# Patient Record
Sex: MALE | Race: WHITE | NOT HISPANIC OR LATINO | Employment: UNEMPLOYED | URBAN - METROPOLITAN AREA
[De-identification: names, ages, dates, MRNs, and addresses within clinical notes are randomized per-mention and may not be internally consistent; named-entity substitution may affect disease eponyms.]

---

## 2019-06-25 ENCOUNTER — HOSPITAL ENCOUNTER (INPATIENT)
Facility: HOSPITAL | Age: 34
LOS: 5 days | Discharge: HOME HEALTH | End: 2019-06-30
Attending: EMERGENCY MEDICINE | Admitting: INTERNAL MEDICINE

## 2019-06-25 DIAGNOSIS — L08.9 INFECTED WOUND: ICD-10-CM

## 2019-06-25 DIAGNOSIS — M79.605 LEFT LEG PAIN: ICD-10-CM

## 2019-06-25 DIAGNOSIS — L03.116 LEFT LEG CELLULITIS: Primary | ICD-10-CM

## 2019-06-25 DIAGNOSIS — D72.829 LEUKOCYTOSIS, UNSPECIFIED TYPE: ICD-10-CM

## 2019-06-25 DIAGNOSIS — T14.8XXA INFECTED WOUND: ICD-10-CM

## 2019-06-25 DIAGNOSIS — E87.6 HYPOKALEMIA: ICD-10-CM

## 2019-06-25 PROBLEM — E66.01 MORBID OBESITY WITH BMI OF 60.0-69.9, ADULT (HCC): Chronic | Status: ACTIVE | Noted: 2019-06-25

## 2019-06-25 LAB
ANION GAP SERPL CALCULATED.3IONS-SCNC: 13 MMOL/L (ref 10–20)
BASOPHILS # BLD AUTO: 0 10*3/MM3 (ref 0–0.2)
BASOPHILS NFR BLD AUTO: 0.2 % (ref 0–1.5)
BUN BLD-MCNC: 10 MG/DL (ref 8–20)
BUN/CREAT SERPL: 11.1 (ref 6.2–20.3)
CALCIUM SPEC-SCNC: 8.7 MG/DL (ref 8.9–10.3)
CHLORIDE SERPL-SCNC: 98 MMOL/L (ref 101–111)
CO2 SERPL-SCNC: 23 MMOL/L (ref 22–32)
CREAT BLD-MCNC: 0.9 MG/DL (ref 0.7–1.2)
D-LACTATE SERPL-SCNC: 1.6 MMOL/L (ref 0.5–2)
DEPRECATED RDW RBC AUTO: 48.1 FL (ref 37–54)
EOSINOPHIL # BLD AUTO: 0 10*3/MM3 (ref 0–0.4)
EOSINOPHIL NFR BLD AUTO: 0.1 % (ref 0.3–6.2)
ERYTHROCYTE [DISTWIDTH] IN BLOOD BY AUTOMATED COUNT: 15.3 % (ref 12.3–15.4)
GFR SERPL CREATININE-BSD FRML MDRD: 98 ML/MIN/1.73
GLUCOSE BLD-MCNC: 126 MG/DL (ref 65–99)
HCT VFR BLD AUTO: 40.5 % (ref 37.5–51)
HGB BLD-MCNC: 13.1 G/DL (ref 13–17.7)
LYMPHOCYTES # BLD AUTO: 1.2 10*3/MM3 (ref 0.7–3.1)
LYMPHOCYTES NFR BLD AUTO: 6.5 % (ref 19.6–45.3)
MCH RBC QN AUTO: 28.7 PG (ref 26.6–33)
MCHC RBC AUTO-ENTMCNC: 32.4 G/DL (ref 31.5–35.7)
MCV RBC AUTO: 88.6 FL (ref 79–97)
MONOCYTES # BLD AUTO: 1.7 10*3/MM3 (ref 0.1–0.9)
MONOCYTES NFR BLD AUTO: 8.9 % (ref 5–12)
NEUTROPHILS # BLD AUTO: 15.7 10*3/MM3 (ref 1.7–7)
NEUTROPHILS NFR BLD AUTO: 84.3 % (ref 42.7–76)
NEUTS VAC BLD QL SMEAR: NORMAL
NRBC BLD AUTO-RTO: 0 /100 WBC (ref 0–0.2)
PLAT MORPH BLD: NORMAL
PLATELET # BLD AUTO: 249 10*3/MM3 (ref 140–450)
PMV BLD AUTO: 10.9 FL (ref 6–12)
POTASSIUM BLD-SCNC: 3 MMOL/L (ref 3.6–5.1)
RBC # BLD AUTO: 4.57 10*6/MM3 (ref 4.14–5.8)
RBC MORPH BLD: NORMAL
SODIUM BLD-SCNC: 134 MMOL/L (ref 136–144)
TOXIC GRANULATION: NORMAL
WBC NRBC COR # BLD: 18.7 10*3/MM3 (ref 3.4–10.8)

## 2019-06-25 PROCEDURE — 83605 ASSAY OF LACTIC ACID: CPT

## 2019-06-25 PROCEDURE — 99223 1ST HOSP IP/OBS HIGH 75: CPT | Performed by: INTERNAL MEDICINE

## 2019-06-25 PROCEDURE — 99284 EMERGENCY DEPT VISIT MOD MDM: CPT

## 2019-06-25 PROCEDURE — 36415 COLL VENOUS BLD VENIPUNCTURE: CPT

## 2019-06-25 PROCEDURE — 85007 BL SMEAR W/DIFF WBC COUNT: CPT | Performed by: EMERGENCY MEDICINE

## 2019-06-25 PROCEDURE — 25010000002 VANCOMYCIN 750 MG RECONSTITUTED SOLUTION 1 EACH VIAL: Performed by: EMERGENCY MEDICINE

## 2019-06-25 PROCEDURE — 80048 BASIC METABOLIC PNL TOTAL CA: CPT | Performed by: EMERGENCY MEDICINE

## 2019-06-25 PROCEDURE — 87040 BLOOD CULTURE FOR BACTERIA: CPT | Performed by: EMERGENCY MEDICINE

## 2019-06-25 PROCEDURE — 85025 COMPLETE CBC W/AUTO DIFF WBC: CPT | Performed by: EMERGENCY MEDICINE

## 2019-06-25 RX ORDER — POTASSIUM CHLORIDE 20 MEQ/1
40 TABLET, EXTENDED RELEASE ORAL ONCE
Status: COMPLETED | OUTPATIENT
Start: 2019-06-25 | End: 2019-06-25

## 2019-06-25 RX ORDER — SODIUM CHLORIDE, SODIUM LACTATE, POTASSIUM CHLORIDE, CALCIUM CHLORIDE 600; 310; 30; 20 MG/100ML; MG/100ML; MG/100ML; MG/100ML
100 INJECTION, SOLUTION INTRAVENOUS CONTINUOUS
Status: DISCONTINUED | OUTPATIENT
Start: 2019-06-26 | End: 2019-06-29

## 2019-06-25 RX ORDER — CHOLECALCIFEROL (VITAMIN D3) 125 MCG
5 CAPSULE ORAL NIGHTLY PRN
Status: DISCONTINUED | OUTPATIENT
Start: 2019-06-25 | End: 2019-06-30 | Stop reason: HOSPADM

## 2019-06-25 RX ORDER — DOCUSATE SODIUM 100 MG/1
100 CAPSULE, LIQUID FILLED ORAL DAILY
Status: DISCONTINUED | OUTPATIENT
Start: 2019-06-26 | End: 2019-06-30 | Stop reason: HOSPADM

## 2019-06-25 RX ORDER — SODIUM CHLORIDE 0.9 % (FLUSH) 0.9 %
3 SYRINGE (ML) INJECTION EVERY 12 HOURS SCHEDULED
Status: DISCONTINUED | OUTPATIENT
Start: 2019-06-26 | End: 2019-06-30 | Stop reason: HOSPADM

## 2019-06-25 RX ORDER — BISACODYL 10 MG
10 SUPPOSITORY, RECTAL RECTAL DAILY PRN
Status: DISCONTINUED | OUTPATIENT
Start: 2019-06-25 | End: 2019-06-30 | Stop reason: HOSPADM

## 2019-06-25 RX ORDER — SODIUM CHLORIDE 0.9 % (FLUSH) 0.9 %
10 SYRINGE (ML) INJECTION AS NEEDED
Status: DISCONTINUED | OUTPATIENT
Start: 2019-06-25 | End: 2019-06-30 | Stop reason: HOSPADM

## 2019-06-25 RX ORDER — CLINDAMYCIN PHOSPHATE 900 MG/50ML
900 INJECTION, SOLUTION INTRAVENOUS EVERY 8 HOURS
Status: DISCONTINUED | OUTPATIENT
Start: 2019-06-26 | End: 2019-06-29

## 2019-06-25 RX ORDER — HYDROCODONE BITARTRATE AND ACETAMINOPHEN 5; 325 MG/1; MG/1
1 TABLET ORAL EVERY 4 HOURS PRN
Status: DISCONTINUED | OUTPATIENT
Start: 2019-06-25 | End: 2019-06-30 | Stop reason: HOSPADM

## 2019-06-25 RX ORDER — ACETAMINOPHEN 325 MG/1
650 TABLET ORAL EVERY 4 HOURS PRN
Status: DISCONTINUED | OUTPATIENT
Start: 2019-06-25 | End: 2019-06-26

## 2019-06-25 RX ORDER — SODIUM CHLORIDE 0.9 % (FLUSH) 0.9 %
3-10 SYRINGE (ML) INJECTION AS NEEDED
Status: DISCONTINUED | OUTPATIENT
Start: 2019-06-25 | End: 2019-06-30 | Stop reason: HOSPADM

## 2019-06-25 RX ORDER — HEPARIN SODIUM 5000 [USP'U]/ML
5000 INJECTION, SOLUTION INTRAVENOUS; SUBCUTANEOUS EVERY 8 HOURS SCHEDULED
Status: DISCONTINUED | OUTPATIENT
Start: 2019-06-26 | End: 2019-06-30 | Stop reason: HOSPADM

## 2019-06-25 RX ADMIN — POTASSIUM CHLORIDE 40 MEQ: 1500 TABLET, EXTENDED RELEASE ORAL at 18:41

## 2019-06-25 RX ADMIN — VANCOMYCIN HYDROCHLORIDE 2250 MG: 750 INJECTION, POWDER, LYOPHILIZED, FOR SOLUTION INTRAVENOUS at 17:41

## 2019-06-26 ENCOUNTER — ANESTHESIA EVENT (OUTPATIENT)
Dept: PERIOP | Facility: HOSPITAL | Age: 34
End: 2019-06-26

## 2019-06-26 ENCOUNTER — ANESTHESIA (OUTPATIENT)
Dept: PERIOP | Facility: HOSPITAL | Age: 34
End: 2019-06-26

## 2019-06-26 PROBLEM — A41.9 SEPSIS AFFECTING SKIN: Status: ACTIVE | Noted: 2019-06-26

## 2019-06-26 LAB
ANION GAP SERPL CALCULATED.3IONS-SCNC: 12 MMOL/L (ref 10–20)
BUN BLD-MCNC: 11 MG/DL (ref 8–20)
BUN/CREAT SERPL: 11 (ref 6.2–20.3)
CALCIUM SPEC-SCNC: 8.3 MG/DL (ref 8.9–10.3)
CHLORIDE SERPL-SCNC: 101 MMOL/L (ref 101–111)
CO2 SERPL-SCNC: 22 MMOL/L (ref 22–32)
CORTIS SERPL-MCNC: 13.22 MCG/DL
CREAT BLD-MCNC: 1 MG/DL (ref 0.7–1.2)
DEPRECATED RDW RBC AUTO: 46.4 FL (ref 37–54)
EOSINOPHIL # BLD MANUAL: 0.16 10*3/MM3 (ref 0–0.4)
EOSINOPHIL NFR BLD MANUAL: 1 % (ref 0.3–6.2)
ERYTHROCYTE [DISTWIDTH] IN BLOOD BY AUTOMATED COUNT: 15.1 % (ref 12.3–15.4)
GFR SERPL CREATININE-BSD FRML MDRD: 86 ML/MIN/1.73
GLUCOSE BLD-MCNC: 97 MG/DL (ref 65–99)
HBA1C MFR BLD: 5.3 % (ref 3.5–5.6)
HCT VFR BLD AUTO: 36.1 % (ref 37.5–51)
HGB BLD-MCNC: 11.9 G/DL (ref 13–17.7)
LYMPHOCYTES # BLD MANUAL: 2.08 10*3/MM3 (ref 0.7–3.1)
LYMPHOCYTES NFR BLD MANUAL: 13 % (ref 19.6–45.3)
LYMPHOCYTES NFR BLD MANUAL: 8 % (ref 5–12)
MCH RBC QN AUTO: 28.9 PG (ref 26.6–33)
MCHC RBC AUTO-ENTMCNC: 32.9 G/DL (ref 31.5–35.7)
MCV RBC AUTO: 87.8 FL (ref 79–97)
MONOCYTES # BLD AUTO: 1.28 10*3/MM3 (ref 0.1–0.9)
NEUTROPHILS # BLD AUTO: 12.48 10*3/MM3 (ref 1.7–7)
NEUTROPHILS NFR BLD MANUAL: 66 % (ref 42.7–76)
NEUTS BAND NFR BLD MANUAL: 12 % (ref 0–5)
PLAT MORPH BLD: NORMAL
PLATELET # BLD AUTO: 233 10*3/MM3 (ref 140–450)
PMV BLD AUTO: 10.6 FL (ref 6–12)
POTASSIUM BLD-SCNC: 3 MMOL/L (ref 3.6–5.1)
POTASSIUM BLD-SCNC: 3.7 MMOL/L (ref 3.6–5.1)
RBC # BLD AUTO: 4.11 10*6/MM3 (ref 4.14–5.8)
RBC MORPH BLD: NORMAL
SCAN SLIDE: NORMAL
SODIUM BLD-SCNC: 135 MMOL/L (ref 136–144)
TOXIC GRANULATION: ABNORMAL
TSH SERPL DL<=0.05 MIU/L-ACNC: 2.01 MIU/ML (ref 0.34–5.6)
WBC NRBC COR # BLD: 16 10*3/MM3 (ref 3.4–10.8)

## 2019-06-26 PROCEDURE — 25010000002 HYDROMORPHONE PER 4 MG: Performed by: ANESTHESIOLOGIST ASSISTANT

## 2019-06-26 PROCEDURE — 82533 TOTAL CORTISOL: CPT | Performed by: INTERNAL MEDICINE

## 2019-06-26 PROCEDURE — 99222 1ST HOSP IP/OBS MODERATE 55: CPT | Performed by: SURGERY

## 2019-06-26 PROCEDURE — 87070 CULTURE OTHR SPECIMN AEROBIC: CPT | Performed by: NURSE PRACTITIONER

## 2019-06-26 PROCEDURE — 84443 ASSAY THYROID STIM HORMONE: CPT | Performed by: INTERNAL MEDICINE

## 2019-06-26 PROCEDURE — 25010000002 TDAP 5-2.5-18.5 LF-MCG/0.5 SUSPENSION: Performed by: INTERNAL MEDICINE

## 2019-06-26 PROCEDURE — 80048 BASIC METABOLIC PNL TOTAL CA: CPT | Performed by: INTERNAL MEDICINE

## 2019-06-26 PROCEDURE — 11045 DBRDMT SUBQ TISS EACH ADDL: CPT | Performed by: SURGERY

## 2019-06-26 PROCEDURE — 25010000002 DEXAMETHASONE PER 1 MG: Performed by: ANESTHESIOLOGIST ASSISTANT

## 2019-06-26 PROCEDURE — 25010000002 ONDANSETRON PER 1 MG: Performed by: ANESTHESIOLOGIST ASSISTANT

## 2019-06-26 PROCEDURE — 87147 CULTURE TYPE IMMUNOLOGIC: CPT | Performed by: NURSE PRACTITIONER

## 2019-06-26 PROCEDURE — 25010000002 HEPARIN (PORCINE) PER 1000 UNITS: Performed by: INTERNAL MEDICINE

## 2019-06-26 PROCEDURE — 0Y9J0ZZ DRAINAGE OF LEFT LOWER LEG, OPEN APPROACH: ICD-10-PCS | Performed by: SURGERY

## 2019-06-26 PROCEDURE — 87075 CULTR BACTERIA EXCEPT BLOOD: CPT | Performed by: SURGERY

## 2019-06-26 PROCEDURE — 83036 HEMOGLOBIN GLYCOSYLATED A1C: CPT | Performed by: INTERNAL MEDICINE

## 2019-06-26 PROCEDURE — 90715 TDAP VACCINE 7 YRS/> IM: CPT | Performed by: INTERNAL MEDICINE

## 2019-06-26 PROCEDURE — 94640 AIRWAY INHALATION TREATMENT: CPT

## 2019-06-26 PROCEDURE — 87186 SC STD MICRODIL/AGAR DIL: CPT | Performed by: NURSE PRACTITIONER

## 2019-06-26 PROCEDURE — 25010000002 LINEZOLID 600 MG/300ML SOLUTION: Performed by: INTERNAL MEDICINE

## 2019-06-26 PROCEDURE — 0JBP0ZZ EXCISION OF LEFT LOWER LEG SUBCUTANEOUS TISSUE AND FASCIA, OPEN APPROACH: ICD-10-PCS | Performed by: SURGERY

## 2019-06-26 PROCEDURE — 87205 SMEAR GRAM STAIN: CPT | Performed by: NURSE PRACTITIONER

## 2019-06-26 PROCEDURE — 84132 ASSAY OF SERUM POTASSIUM: CPT | Performed by: INTERNAL MEDICINE

## 2019-06-26 PROCEDURE — 85007 BL SMEAR W/DIFF WBC COUNT: CPT | Performed by: INTERNAL MEDICINE

## 2019-06-26 PROCEDURE — 90471 IMMUNIZATION ADMIN: CPT | Performed by: INTERNAL MEDICINE

## 2019-06-26 PROCEDURE — 25010000002 MIDAZOLAM PER 1 MG: Performed by: ANESTHESIOLOGIST ASSISTANT

## 2019-06-26 PROCEDURE — 85025 COMPLETE CBC W/AUTO DIFF WBC: CPT | Performed by: INTERNAL MEDICINE

## 2019-06-26 PROCEDURE — 25010000002 CEFEPIME PER 500 MG: Performed by: INTERNAL MEDICINE

## 2019-06-26 PROCEDURE — 11042 DBRDMT SUBQ TIS 1ST 20SQCM/<: CPT | Performed by: SURGERY

## 2019-06-26 PROCEDURE — 25010000002 FENTANYL CITRATE (PF) 100 MCG/2ML SOLUTION: Performed by: ANESTHESIOLOGIST ASSISTANT

## 2019-06-26 PROCEDURE — 94799 UNLISTED PULMONARY SVC/PX: CPT

## 2019-06-26 PROCEDURE — 25010000002 PROPOFOL 10 MG/ML EMULSION: Performed by: ANESTHESIOLOGIST ASSISTANT

## 2019-06-26 PROCEDURE — 99233 SBSQ HOSP IP/OBS HIGH 50: CPT | Performed by: INTERNAL MEDICINE

## 2019-06-26 RX ORDER — CLINDAMYCIN PHOSPHATE 600 MG/50ML
INJECTION INTRAVENOUS AS NEEDED
Status: DISCONTINUED | OUTPATIENT
Start: 2019-06-26 | End: 2019-06-26 | Stop reason: SURG

## 2019-06-26 RX ORDER — SODIUM CHLORIDE, SODIUM LACTATE, POTASSIUM CHLORIDE, CALCIUM CHLORIDE 600; 310; 30; 20 MG/100ML; MG/100ML; MG/100ML; MG/100ML
9 INJECTION, SOLUTION INTRAVENOUS CONTINUOUS PRN
Status: DISCONTINUED | OUTPATIENT
Start: 2019-06-26 | End: 2019-06-26 | Stop reason: HOSPADM

## 2019-06-26 RX ORDER — MIDAZOLAM HYDROCHLORIDE 1 MG/ML
INJECTION INTRAMUSCULAR; INTRAVENOUS AS NEEDED
Status: DISCONTINUED | OUTPATIENT
Start: 2019-06-26 | End: 2019-06-26 | Stop reason: SURG

## 2019-06-26 RX ORDER — SODIUM CHLORIDE 0.9 % (FLUSH) 0.9 %
3-10 SYRINGE (ML) INJECTION AS NEEDED
Status: DISCONTINUED | OUTPATIENT
Start: 2019-06-26 | End: 2019-06-26 | Stop reason: HOSPADM

## 2019-06-26 RX ORDER — LABETALOL HYDROCHLORIDE 5 MG/ML
5 INJECTION, SOLUTION INTRAVENOUS AS NEEDED
Status: DISCONTINUED | OUTPATIENT
Start: 2019-06-26 | End: 2019-06-30 | Stop reason: HOSPADM

## 2019-06-26 RX ORDER — IPRATROPIUM BROMIDE AND ALBUTEROL SULFATE 2.5; .5 MG/3ML; MG/3ML
3 SOLUTION RESPIRATORY (INHALATION) ONCE
Status: COMPLETED | OUTPATIENT
Start: 2019-06-26 | End: 2019-06-26

## 2019-06-26 RX ORDER — SODIUM CHLORIDE 0.9 % (FLUSH) 0.9 %
3 SYRINGE (ML) INJECTION EVERY 12 HOURS SCHEDULED
Status: DISCONTINUED | OUTPATIENT
Start: 2019-06-26 | End: 2019-06-26 | Stop reason: HOSPADM

## 2019-06-26 RX ORDER — LIDOCAINE HYDROCHLORIDE 20 MG/ML
INJECTION, SOLUTION EPIDURAL; INFILTRATION; INTRACAUDAL; PERINEURAL AS NEEDED
Status: DISCONTINUED | OUTPATIENT
Start: 2019-06-26 | End: 2019-06-26 | Stop reason: SURG

## 2019-06-26 RX ORDER — MORPHINE SULFATE 4 MG/ML
4 INJECTION, SOLUTION INTRAMUSCULAR; INTRAVENOUS
Status: DISCONTINUED | OUTPATIENT
Start: 2019-06-26 | End: 2019-06-30 | Stop reason: HOSPADM

## 2019-06-26 RX ORDER — LORAZEPAM 2 MG/ML
1 INJECTION INTRAMUSCULAR ONCE
Status: DISCONTINUED | OUTPATIENT
Start: 2019-06-26 | End: 2019-06-30 | Stop reason: HOSPADM

## 2019-06-26 RX ORDER — POTASSIUM CHLORIDE 1.5 G/1.77G
40 POWDER, FOR SOLUTION ORAL AS NEEDED
Status: DISCONTINUED | OUTPATIENT
Start: 2019-06-26 | End: 2019-06-30 | Stop reason: HOSPADM

## 2019-06-26 RX ORDER — PROPOFOL 10 MG/ML
VIAL (ML) INTRAVENOUS AS NEEDED
Status: DISCONTINUED | OUTPATIENT
Start: 2019-06-26 | End: 2019-06-26 | Stop reason: SURG

## 2019-06-26 RX ORDER — ONDANSETRON 2 MG/ML
INJECTION INTRAMUSCULAR; INTRAVENOUS AS NEEDED
Status: DISCONTINUED | OUTPATIENT
Start: 2019-06-26 | End: 2019-06-26 | Stop reason: SURG

## 2019-06-26 RX ORDER — ONDANSETRON 2 MG/ML
4 INJECTION INTRAMUSCULAR; INTRAVENOUS ONCE AS NEEDED
Status: COMPLETED | OUTPATIENT
Start: 2019-06-26 | End: 2019-06-26

## 2019-06-26 RX ORDER — NALOXONE HCL 0.4 MG/ML
0.4 VIAL (ML) INJECTION
Status: DISCONTINUED | OUTPATIENT
Start: 2019-06-26 | End: 2019-06-30 | Stop reason: HOSPADM

## 2019-06-26 RX ORDER — METOCLOPRAMIDE HYDROCHLORIDE 5 MG/ML
10 INJECTION INTRAMUSCULAR; INTRAVENOUS ONCE AS NEEDED
Status: DISCONTINUED | OUTPATIENT
Start: 2019-06-26 | End: 2019-06-30 | Stop reason: HOSPADM

## 2019-06-26 RX ORDER — HYDROMORPHONE HCL 110MG/55ML
0.5 PATIENT CONTROLLED ANALGESIA SYRINGE INTRAVENOUS
Status: DISCONTINUED | OUTPATIENT
Start: 2019-06-26 | End: 2019-06-26 | Stop reason: HOSPADM

## 2019-06-26 RX ORDER — ROCURONIUM BROMIDE 10 MG/ML
INJECTION, SOLUTION INTRAVENOUS AS NEEDED
Status: DISCONTINUED | OUTPATIENT
Start: 2019-06-26 | End: 2019-06-26 | Stop reason: SURG

## 2019-06-26 RX ORDER — LINEZOLID 2 MG/ML
600 INJECTION, SOLUTION INTRAVENOUS EVERY 12 HOURS
Status: DISCONTINUED | OUTPATIENT
Start: 2019-06-26 | End: 2019-06-29

## 2019-06-26 RX ORDER — MIDAZOLAM HYDROCHLORIDE 1 MG/ML
1 INJECTION INTRAMUSCULAR; INTRAVENOUS
Status: DISCONTINUED | OUTPATIENT
Start: 2019-06-26 | End: 2019-06-30 | Stop reason: HOSPADM

## 2019-06-26 RX ORDER — DEXAMETHASONE SODIUM PHOSPHATE 4 MG/ML
INJECTION, SOLUTION INTRA-ARTICULAR; INTRALESIONAL; INTRAMUSCULAR; INTRAVENOUS; SOFT TISSUE AS NEEDED
Status: DISCONTINUED | OUTPATIENT
Start: 2019-06-26 | End: 2019-06-26 | Stop reason: SURG

## 2019-06-26 RX ORDER — POTASSIUM CHLORIDE 20 MEQ/1
40 TABLET, EXTENDED RELEASE ORAL AS NEEDED
Status: DISCONTINUED | OUTPATIENT
Start: 2019-06-26 | End: 2019-06-30 | Stop reason: HOSPADM

## 2019-06-26 RX ORDER — FENTANYL CITRATE 50 UG/ML
INJECTION, SOLUTION INTRAMUSCULAR; INTRAVENOUS AS NEEDED
Status: DISCONTINUED | OUTPATIENT
Start: 2019-06-26 | End: 2019-06-26 | Stop reason: SURG

## 2019-06-26 RX ORDER — MIDAZOLAM HYDROCHLORIDE 1 MG/ML
2 INJECTION INTRAMUSCULAR; INTRAVENOUS
Status: DISCONTINUED | OUTPATIENT
Start: 2019-06-26 | End: 2019-06-30 | Stop reason: HOSPADM

## 2019-06-26 RX ADMIN — LIDOCAINE HYDROCHLORIDE 50 MG: 20 INJECTION, SOLUTION EPIDURAL; INFILTRATION; INTRACAUDAL; PERINEURAL at 09:41

## 2019-06-26 RX ADMIN — ROCURONIUM BROMIDE 20 MG: 10 INJECTION, SOLUTION INTRAVENOUS at 09:53

## 2019-06-26 RX ADMIN — HYDROCODONE BITARTRATE AND ACETAMINOPHEN 1 TABLET: 5; 325 TABLET ORAL at 17:00

## 2019-06-26 RX ADMIN — ROCURONIUM BROMIDE 30 MG: 10 INJECTION, SOLUTION INTRAVENOUS at 09:42

## 2019-06-26 RX ADMIN — PROPOFOL 200 MG: 10 INJECTION, EMULSION INTRAVENOUS at 09:40

## 2019-06-26 RX ADMIN — SUGAMMADEX 200 MG: 100 INJECTION, SOLUTION INTRAVENOUS at 10:17

## 2019-06-26 RX ADMIN — POTASSIUM CHLORIDE 40 MEQ: 1500 TABLET, EXTENDED RELEASE ORAL at 13:29

## 2019-06-26 RX ADMIN — HEPARIN SODIUM 5000 UNITS: 5000 INJECTION INTRAVENOUS; SUBCUTANEOUS at 21:09

## 2019-06-26 RX ADMIN — HYDROMORPHONE HYDROCHLORIDE 0.5 MG: 2 INJECTION, SOLUTION INTRAMUSCULAR; INTRAVENOUS; SUBCUTANEOUS at 11:13

## 2019-06-26 RX ADMIN — TETANUS TOXOID, REDUCED DIPHTHERIA TOXOID AND ACELLULAR PERTUSSIS VACCINE, ADSORBED 0.5 ML: 5; 2.5; 8; 8; 2.5 SUSPENSION INTRAMUSCULAR at 18:11

## 2019-06-26 RX ADMIN — Medication 10 ML: at 00:40

## 2019-06-26 RX ADMIN — SODIUM CHLORIDE, SODIUM LACTATE, POTASSIUM CHLORIDE, AND CALCIUM CHLORIDE 1000 ML: 600; 310; 30; 20 INJECTION, SOLUTION INTRAVENOUS at 02:25

## 2019-06-26 RX ADMIN — HEPARIN SODIUM 5000 UNITS: 5000 INJECTION INTRAVENOUS; SUBCUTANEOUS at 05:59

## 2019-06-26 RX ADMIN — HYDROMORPHONE HYDROCHLORIDE 0.5 MG: 2 INJECTION, SOLUTION INTRAMUSCULAR; INTRAVENOUS; SUBCUTANEOUS at 10:58

## 2019-06-26 RX ADMIN — CLINDAMYCIN PHOSPHATE 900 MG: 900 INJECTION, SOLUTION INTRAVENOUS at 18:17

## 2019-06-26 RX ADMIN — CLINDAMYCIN PHOSPHATE 900 MG: 600 INJECTION INTRAVENOUS at 09:47

## 2019-06-26 RX ADMIN — ONDANSETRON 4 MG: 2 INJECTION INTRAMUSCULAR; INTRAVENOUS at 10:52

## 2019-06-26 RX ADMIN — IPRATROPIUM BROMIDE AND ALBUTEROL SULFATE 3 ML: .5; 3 SOLUTION RESPIRATORY (INHALATION) at 13:06

## 2019-06-26 RX ADMIN — HEPARIN SODIUM 5000 UNITS: 5000 INJECTION INTRAVENOUS; SUBCUTANEOUS at 13:30

## 2019-06-26 RX ADMIN — SODIUM CHLORIDE, SODIUM LACTATE, POTASSIUM CHLORIDE, AND CALCIUM CHLORIDE 100 ML/HR: 600; 310; 30; 20 INJECTION, SOLUTION INTRAVENOUS at 00:40

## 2019-06-26 RX ADMIN — LINEZOLID 600 MG: 600 INJECTION, SOLUTION INTRAVENOUS at 16:40

## 2019-06-26 RX ADMIN — SODIUM CHLORIDE, SODIUM LACTATE, POTASSIUM CHLORIDE, AND CALCIUM CHLORIDE 1000 ML: 600; 310; 30; 20 INJECTION, SOLUTION INTRAVENOUS at 00:39

## 2019-06-26 RX ADMIN — POTASSIUM CHLORIDE 40 MEQ: 1500 TABLET, EXTENDED RELEASE ORAL at 18:03

## 2019-06-26 RX ADMIN — CLINDAMYCIN PHOSPHATE 900 MG: 900 INJECTION, SOLUTION INTRAVENOUS at 09:37

## 2019-06-26 RX ADMIN — CLINDAMYCIN PHOSPHATE 900 MG: 900 INJECTION, SOLUTION INTRAVENOUS at 00:38

## 2019-06-26 RX ADMIN — FENTANYL CITRATE 100 MCG: 50 INJECTION, SOLUTION INTRAMUSCULAR; INTRAVENOUS at 10:05

## 2019-06-26 RX ADMIN — ACETAMINOPHEN 650 MG: 325 TABLET, FILM COATED ORAL at 01:12

## 2019-06-26 RX ADMIN — FENTANYL CITRATE 100 MCG: 50 INJECTION, SOLUTION INTRAMUSCULAR; INTRAVENOUS at 09:41

## 2019-06-26 RX ADMIN — MIDAZOLAM 2 MG: 1 INJECTION INTRAMUSCULAR; INTRAVENOUS at 09:35

## 2019-06-26 RX ADMIN — CEFEPIME 2 G: 2 INJECTION, POWDER, FOR SOLUTION INTRAVENOUS at 18:53

## 2019-06-26 RX ADMIN — DOCUSATE SODIUM 100 MG: 100 CAPSULE, LIQUID FILLED ORAL at 08:55

## 2019-06-26 RX ADMIN — POTASSIUM CHLORIDE 40 MEQ: 1500 TABLET, EXTENDED RELEASE ORAL at 08:55

## 2019-06-26 RX ADMIN — DEXAMETHASONE SODIUM PHOSPHATE 4 MG: 4 INJECTION, SOLUTION INTRAMUSCULAR; INTRAVENOUS at 09:45

## 2019-06-26 RX ADMIN — ONDANSETRON 4 MG: 2 INJECTION INTRAMUSCULAR; INTRAVENOUS at 09:49

## 2019-06-26 NOTE — ANESTHESIA POSTPROCEDURE EVALUATION
Patient: Arden Castro    Procedure Summary     Date:  06/26/19 Room / Location:  [facility] OR 09 / [facility] MAIN OR    Anesthesia Start:  0934 Anesthesia Stop:  1029    Procedure:  Excisional debridement of left lower extremity 11 x 10 cm (Left ) Diagnosis:       Infected wound      (Infected wound [T14.8XXA, L08.9])    Surgeon:  Edward Solo MD Provider:  Damián Worthy MD    Anesthesia Type:  general ASA Status:  4          Anesthesia Type: general  Last vitals  BP   141/85 (06/26/19 1241)   Temp   99.4 °F (37.4 °C) (06/26/19 1241)   Pulse   100 (06/26/19 1308)   Resp   20 (06/26/19 1308)     SpO2   96 % (06/26/19 1306)     Post Anesthesia Care and Evaluation    Patient location during evaluation: PACU  Patient participation: complete - patient participated  Level of consciousness: awake  Pain management: adequate  Airway patency: patent  Anesthetic complications: No anesthetic complications  PONV Status: none  Cardiovascular status: acceptable  Respiratory status: acceptable  Hydration status: acceptable    Comments: Patient seen and examined postoperatively; vital signs stable; SpO2 greater than or equal to 90%; cardiopulmonary status stable; nausea/vomiting adequately controlled; pain adequately controlled; no apparent anesthesia complications; patient discharged from anesthesia care when discharge criteria were met

## 2019-06-26 NOTE — ANESTHESIA PREPROCEDURE EVALUATION
Anesthesia Evaluation     Patient summary reviewed and Nursing notes reviewed   no history of anesthetic complications:  NPO Solid Status: > 8 hours  NPO Liquid Status: > 8 hours           Airway   Mallampati: I  TM distance: >3 FB  Neck ROM: full  No difficulty expected  Dental - normal exam     Pulmonary - normal exam   Cardiovascular - normal exam        Neuro/Psych  GI/Hepatic/Renal/Endo    (+) morbid obesity,      Musculoskeletal     Abdominal   (+) obese,    Substance History      OB/GYN          Other        ROS/Med Hx Other: Cellulitis, hypokalemia                Anesthesia Plan    ASA 4     general   (Patient identified; pre-operative vital signs, all relevant labs/studies, complete medical/surgical/anesthetic history, full medication list, full allergy list, and NPO status obtained/reviewed; physical assessment performed; anesthetic options, side effects, potential complications, risks, and benefits discussed; questions answered; written anesthesia consent obtained; patient cleared for procedure; anesthesia machine and equipment checked and functioning)  intravenous induction   Anesthetic plan, all risks, benefits, and alternatives have been provided, discussed and informed consent has been obtained with: patient.    Plan discussed with CAA.

## 2019-06-26 NOTE — ANESTHESIA PROCEDURE NOTES
Airway  Urgency: elective    Airway not difficult    General Information and Staff    Patient location during procedure: OR    Indications and Patient Condition    Preoxygenated: yes  MILS maintained throughout  Mask difficulty assessment: 2 - vent by mask + OA or adjuvant +/- NMBA    Final Airway Details  Final airway type: endotracheal airway      Successful airway: ETT  Cuffed: yes   Successful intubation technique: direct laryngoscopy  Endotracheal tube insertion site: oral  Blade: Fernando  Blade size: 4  ETT size (mm): 7.5  Cormack-Lehane Classification: grade IIa - partial view of glottis  Placement verified by: chest auscultation   Cuff volume (mL): 10  Measured from: teeth  ETT to teeth (cm): 24  Number of attempts at approach: 1

## 2019-06-27 LAB
ANION GAP SERPL CALCULATED.3IONS-SCNC: 15.6 MMOL/L (ref 10–20)
BUN BLD-MCNC: 14 MG/DL (ref 8–20)
BUN/CREAT SERPL: 15.6 (ref 6.2–20.3)
CALCIUM SPEC-SCNC: 8.1 MG/DL (ref 8.9–10.3)
CHLORIDE SERPL-SCNC: 105 MMOL/L (ref 101–111)
CO2 SERPL-SCNC: 20 MMOL/L (ref 22–32)
CREAT BLD-MCNC: 0.9 MG/DL (ref 0.7–1.2)
DEPRECATED RDW RBC AUTO: 46.8 FL (ref 37–54)
ERYTHROCYTE [DISTWIDTH] IN BLOOD BY AUTOMATED COUNT: 15.1 % (ref 12.3–15.4)
GFR SERPL CREATININE-BSD FRML MDRD: 97 ML/MIN/1.73
GLUCOSE BLD-MCNC: 100 MG/DL (ref 65–99)
HCT VFR BLD AUTO: 33.6 % (ref 37.5–51)
HGB BLD-MCNC: 11 G/DL (ref 13–17.7)
LYMPHOCYTES # BLD MANUAL: 1.59 10*3/MM3 (ref 0.7–3.1)
LYMPHOCYTES NFR BLD MANUAL: 7 % (ref 5–12)
LYMPHOCYTES NFR BLD MANUAL: 9 % (ref 19.6–45.3)
MCH RBC QN AUTO: 28.9 PG (ref 26.6–33)
MCHC RBC AUTO-ENTMCNC: 32.6 G/DL (ref 31.5–35.7)
MCV RBC AUTO: 88.5 FL (ref 79–97)
METAMYELOCYTES NFR BLD MANUAL: 4 % (ref 0–0)
MONOCYTES # BLD AUTO: 1.24 10*3/MM3 (ref 0.1–0.9)
MYELOCYTES NFR BLD MANUAL: 1 % (ref 0–0)
NEUTROPHILS # BLD AUTO: 13.98 10*3/MM3 (ref 1.7–7)
NEUTROPHILS NFR BLD MANUAL: 76 % (ref 42.7–76)
NEUTS BAND NFR BLD MANUAL: 3 % (ref 0–5)
PLAT MORPH BLD: NORMAL
PLATELET # BLD AUTO: 259 10*3/MM3 (ref 140–450)
PMV BLD AUTO: 10.5 FL (ref 6–12)
POTASSIUM BLD-SCNC: 3.6 MMOL/L (ref 3.6–5.1)
RBC # BLD AUTO: 3.8 10*6/MM3 (ref 4.14–5.8)
RBC MORPH BLD: NORMAL
SCAN SLIDE: NORMAL
SODIUM BLD-SCNC: 137 MMOL/L (ref 136–144)
WBC MORPH BLD: NORMAL
WBC NRBC COR # BLD: 17.7 10*3/MM3 (ref 3.4–10.8)

## 2019-06-27 PROCEDURE — 25010000002 MORPHINE PER 10 MG: Performed by: SURGERY

## 2019-06-27 PROCEDURE — 85007 BL SMEAR W/DIFF WBC COUNT: CPT | Performed by: INTERNAL MEDICINE

## 2019-06-27 PROCEDURE — 80048 BASIC METABOLIC PNL TOTAL CA: CPT | Performed by: INTERNAL MEDICINE

## 2019-06-27 PROCEDURE — 25010000002 CEFEPIME PER 500 MG: Performed by: INTERNAL MEDICINE

## 2019-06-27 PROCEDURE — 99232 SBSQ HOSP IP/OBS MODERATE 35: CPT | Performed by: SURGERY

## 2019-06-27 PROCEDURE — 99232 SBSQ HOSP IP/OBS MODERATE 35: CPT | Performed by: INTERNAL MEDICINE

## 2019-06-27 PROCEDURE — 85025 COMPLETE CBC W/AUTO DIFF WBC: CPT | Performed by: INTERNAL MEDICINE

## 2019-06-27 PROCEDURE — 25010000002 LINEZOLID 600 MG/300ML SOLUTION: Performed by: INTERNAL MEDICINE

## 2019-06-27 PROCEDURE — 25010000002 HEPARIN (PORCINE) PER 1000 UNITS: Performed by: INTERNAL MEDICINE

## 2019-06-27 RX ADMIN — LINEZOLID 600 MG: 600 INJECTION, SOLUTION INTRAVENOUS at 04:46

## 2019-06-27 RX ADMIN — CEFEPIME 2 G: 2 INJECTION, POWDER, FOR SOLUTION INTRAVENOUS at 02:48

## 2019-06-27 RX ADMIN — HEPARIN SODIUM 5000 UNITS: 5000 INJECTION INTRAVENOUS; SUBCUTANEOUS at 05:39

## 2019-06-27 RX ADMIN — CLINDAMYCIN PHOSPHATE 900 MG: 900 INJECTION, SOLUTION INTRAVENOUS at 08:08

## 2019-06-27 RX ADMIN — CLINDAMYCIN PHOSPHATE 900 MG: 900 INJECTION, SOLUTION INTRAVENOUS at 17:37

## 2019-06-27 RX ADMIN — Medication 3 ML: at 08:10

## 2019-06-27 RX ADMIN — CLINDAMYCIN PHOSPHATE 900 MG: 900 INJECTION, SOLUTION INTRAVENOUS at 00:13

## 2019-06-27 RX ADMIN — HEPARIN SODIUM 5000 UNITS: 5000 INJECTION INTRAVENOUS; SUBCUTANEOUS at 14:23

## 2019-06-27 RX ADMIN — MORPHINE SULFATE 4 MG: 4 INJECTION INTRAVENOUS at 05:39

## 2019-06-27 RX ADMIN — CEFEPIME 2 G: 2 INJECTION, POWDER, FOR SOLUTION INTRAVENOUS at 09:51

## 2019-06-27 RX ADMIN — HYDROCODONE BITARTRATE AND ACETAMINOPHEN 1 TABLET: 5; 325 TABLET ORAL at 04:46

## 2019-06-27 RX ADMIN — CEFEPIME 2 G: 2 INJECTION, POWDER, FOR SOLUTION INTRAVENOUS at 18:16

## 2019-06-27 RX ADMIN — MORPHINE SULFATE 4 MG: 4 INJECTION INTRAVENOUS at 16:43

## 2019-06-27 RX ADMIN — SODIUM CHLORIDE, SODIUM LACTATE, POTASSIUM CHLORIDE, AND CALCIUM CHLORIDE 100 ML/HR: 600; 310; 30; 20 INJECTION, SOLUTION INTRAVENOUS at 00:13

## 2019-06-27 RX ADMIN — HEPARIN SODIUM 5000 UNITS: 5000 INJECTION INTRAVENOUS; SUBCUTANEOUS at 21:29

## 2019-06-27 RX ADMIN — LINEZOLID 600 MG: 600 INJECTION, SOLUTION INTRAVENOUS at 15:32

## 2019-06-28 ENCOUNTER — HOSPITAL ENCOUNTER (OUTPATIENT)
Dept: CARDIOLOGY | Facility: HOSPITAL | Age: 34
Discharge: HOME | End: 2019-06-28

## 2019-06-28 LAB
ANION GAP SERPL CALCULATED.3IONS-SCNC: 17.5 MMOL/L (ref 10–20)
ANISOCYTOSIS BLD QL: ABNORMAL
BH CV LOWER VASCULAR LEFT COMMON FEMORAL AUGMENT: NORMAL
BH CV LOWER VASCULAR LEFT COMMON FEMORAL COMPETENT: NORMAL
BH CV LOWER VASCULAR LEFT COMMON FEMORAL COMPRESS: NORMAL
BH CV LOWER VASCULAR LEFT COMMON FEMORAL PHASIC: NORMAL
BH CV LOWER VASCULAR LEFT COMMON FEMORAL SPONT: NORMAL
BH CV LOWER VASCULAR LEFT DISTAL FEMORAL COMPRESS: NORMAL
BH CV LOWER VASCULAR LEFT GREATER SAPH AK COMPRESS: NORMAL
BH CV LOWER VASCULAR LEFT GREATER SAPH BK COMPRESS: NORMAL
BH CV LOWER VASCULAR LEFT LESSER SAPH COMPRESS: NORMAL
BH CV LOWER VASCULAR LEFT MID FEMORAL AUGMENT: NORMAL
BH CV LOWER VASCULAR LEFT MID FEMORAL COMPETENT: NORMAL
BH CV LOWER VASCULAR LEFT MID FEMORAL COMPRESS: NORMAL
BH CV LOWER VASCULAR LEFT MID FEMORAL PHASIC: NORMAL
BH CV LOWER VASCULAR LEFT MID FEMORAL SPONT: NORMAL
BH CV LOWER VASCULAR LEFT POPLITEAL AUGMENT: NORMAL
BH CV LOWER VASCULAR LEFT POPLITEAL COMPETENT: NORMAL
BH CV LOWER VASCULAR LEFT POPLITEAL COMPRESS: NORMAL
BH CV LOWER VASCULAR LEFT POPLITEAL PHASIC: NORMAL
BH CV LOWER VASCULAR LEFT POPLITEAL SPONT: NORMAL
BH CV LOWER VASCULAR LEFT POSTERIOR TIBIAL COMPRESS: NORMAL
BH CV LOWER VASCULAR LEFT PROXIMAL FEMORAL COMPRESS: NORMAL
BH CV LOWER VASCULAR LEFT SAPHENOFEMORAL JUNCTION AUGMENT: NORMAL
BH CV LOWER VASCULAR LEFT SAPHENOFEMORAL JUNCTION COMPETENT: NORMAL
BH CV LOWER VASCULAR LEFT SAPHENOFEMORAL JUNCTION COMPRESS: NORMAL
BH CV LOWER VASCULAR LEFT SAPHENOFEMORAL JUNCTION PHASIC: NORMAL
BH CV LOWER VASCULAR LEFT SAPHENOFEMORAL JUNCTION SPONT: NORMAL
BH CV LOWER VASCULAR RIGHT COMMON FEMORAL AUGMENT: NORMAL
BH CV LOWER VASCULAR RIGHT COMMON FEMORAL COMPETENT: NORMAL
BH CV LOWER VASCULAR RIGHT COMMON FEMORAL COMPRESS: NORMAL
BH CV LOWER VASCULAR RIGHT COMMON FEMORAL PHASIC: NORMAL
BH CV LOWER VASCULAR RIGHT COMMON FEMORAL SPONT: NORMAL
BUN BLD-MCNC: 18 MG/DL (ref 8–20)
BUN/CREAT SERPL: 18 (ref 6.2–20.3)
CALCIUM SPEC-SCNC: 8.1 MG/DL (ref 8.9–10.3)
CHLORIDE SERPL-SCNC: 105 MMOL/L (ref 101–111)
CO2 SERPL-SCNC: 19 MMOL/L (ref 22–32)
CREAT BLD-MCNC: 1 MG/DL (ref 0.7–1.2)
DEPRECATED RDW RBC AUTO: 47.3 FL (ref 37–54)
ERYTHROCYTE [DISTWIDTH] IN BLOOD BY AUTOMATED COUNT: 15 % (ref 12.3–15.4)
GFR SERPL CREATININE-BSD FRML MDRD: 86 ML/MIN/1.73
GIANT PLATELETS: ABNORMAL
GLUCOSE BLD-MCNC: 95 MG/DL (ref 65–99)
HCT VFR BLD AUTO: 32.2 % (ref 37.5–51)
HGB BLD-MCNC: 10.5 G/DL (ref 13–17.7)
LYMPHOCYTES # BLD MANUAL: 2.06 10*3/MM3 (ref 0.7–3.1)
LYMPHOCYTES NFR BLD MANUAL: 11 % (ref 19.6–45.3)
LYMPHOCYTES NFR BLD MANUAL: 7 % (ref 5–12)
MCH RBC QN AUTO: 28.9 PG (ref 26.6–33)
MCHC RBC AUTO-ENTMCNC: 32.6 G/DL (ref 31.5–35.7)
MCV RBC AUTO: 88.5 FL (ref 79–97)
METAMYELOCYTES NFR BLD MANUAL: 2 % (ref 0–0)
MONOCYTES # BLD AUTO: 1.31 10*3/MM3 (ref 0.1–0.9)
MYELOCYTES NFR BLD MANUAL: 4 % (ref 0–0)
NEUTROPHILS # BLD AUTO: 14.21 10*3/MM3 (ref 1.7–7)
NEUTROPHILS NFR BLD MANUAL: 65 % (ref 42.7–76)
NEUTS BAND NFR BLD MANUAL: 11 % (ref 0–5)
NRBC SPEC MANUAL: 1 /100 WBC (ref 0–0.2)
PLATELET # BLD AUTO: 285 10*3/MM3 (ref 140–450)
PMV BLD AUTO: 10.6 FL (ref 6–12)
POTASSIUM BLD-SCNC: 3.5 MMOL/L (ref 3.6–5.1)
RBC # BLD AUTO: 3.63 10*6/MM3 (ref 4.14–5.8)
SCAN SLIDE: NORMAL
SODIUM BLD-SCNC: 138 MMOL/L (ref 136–144)
WBC MORPH BLD: NORMAL
WBC NRBC COR # BLD: 18.7 10*3/MM3 (ref 3.4–10.8)

## 2019-06-28 PROCEDURE — 93971 EXTREMITY STUDY: CPT

## 2019-06-28 PROCEDURE — 85025 COMPLETE CBC W/AUTO DIFF WBC: CPT | Performed by: INTERNAL MEDICINE

## 2019-06-28 PROCEDURE — 25010000002 LINEZOLID 600 MG/300ML SOLUTION: Performed by: INTERNAL MEDICINE

## 2019-06-28 PROCEDURE — 85007 BL SMEAR W/DIFF WBC COUNT: CPT | Performed by: INTERNAL MEDICINE

## 2019-06-28 PROCEDURE — 25010000002 HEPARIN (PORCINE) PER 1000 UNITS: Performed by: INTERNAL MEDICINE

## 2019-06-28 PROCEDURE — 25010000002 MORPHINE PER 10 MG: Performed by: SURGERY

## 2019-06-28 PROCEDURE — 25010000002 CEFEPIME PER 500 MG: Performed by: INTERNAL MEDICINE

## 2019-06-28 PROCEDURE — 80048 BASIC METABOLIC PNL TOTAL CA: CPT | Performed by: INTERNAL MEDICINE

## 2019-06-28 PROCEDURE — 99232 SBSQ HOSP IP/OBS MODERATE 35: CPT | Performed by: SURGERY

## 2019-06-28 PROCEDURE — 99233 SBSQ HOSP IP/OBS HIGH 50: CPT | Performed by: INTERNAL MEDICINE

## 2019-06-28 RX ADMIN — HEPARIN SODIUM 5000 UNITS: 5000 INJECTION INTRAVENOUS; SUBCUTANEOUS at 05:07

## 2019-06-28 RX ADMIN — CEFEPIME 2 G: 2 INJECTION, POWDER, FOR SOLUTION INTRAVENOUS at 09:33

## 2019-06-28 RX ADMIN — POTASSIUM CHLORIDE 40 MEQ: 1500 TABLET, EXTENDED RELEASE ORAL at 17:13

## 2019-06-28 RX ADMIN — LINEZOLID 600 MG: 600 INJECTION, SOLUTION INTRAVENOUS at 15:14

## 2019-06-28 RX ADMIN — HEPARIN SODIUM 5000 UNITS: 5000 INJECTION INTRAVENOUS; SUBCUTANEOUS at 21:22

## 2019-06-28 RX ADMIN — MORPHINE SULFATE 4 MG: 4 INJECTION INTRAVENOUS at 16:20

## 2019-06-28 RX ADMIN — MORPHINE SULFATE 4 MG: 4 INJECTION INTRAVENOUS at 05:07

## 2019-06-28 RX ADMIN — SODIUM CHLORIDE, SODIUM LACTATE, POTASSIUM CHLORIDE, AND CALCIUM CHLORIDE 100 ML/HR: 600; 310; 30; 20 INJECTION, SOLUTION INTRAVENOUS at 17:11

## 2019-06-28 RX ADMIN — CEFEPIME 2 G: 2 INJECTION, POWDER, FOR SOLUTION INTRAVENOUS at 02:23

## 2019-06-28 RX ADMIN — Medication 3 ML: at 08:05

## 2019-06-28 RX ADMIN — CLINDAMYCIN PHOSPHATE 900 MG: 900 INJECTION, SOLUTION INTRAVENOUS at 16:22

## 2019-06-28 RX ADMIN — SODIUM CHLORIDE, SODIUM LACTATE, POTASSIUM CHLORIDE, AND CALCIUM CHLORIDE 100 ML/HR: 600; 310; 30; 20 INJECTION, SOLUTION INTRAVENOUS at 00:45

## 2019-06-28 RX ADMIN — CLINDAMYCIN PHOSPHATE 900 MG: 900 INJECTION, SOLUTION INTRAVENOUS at 08:03

## 2019-06-28 RX ADMIN — HEPARIN SODIUM 5000 UNITS: 5000 INJECTION INTRAVENOUS; SUBCUTANEOUS at 15:13

## 2019-06-28 RX ADMIN — CLINDAMYCIN PHOSPHATE 900 MG: 900 INJECTION, SOLUTION INTRAVENOUS at 01:48

## 2019-06-28 RX ADMIN — LINEZOLID 600 MG: 600 INJECTION, SOLUTION INTRAVENOUS at 03:52

## 2019-06-29 LAB
ALBUMIN SERPL-MCNC: 2.2 G/DL (ref 3.5–4.8)
ALBUMIN/GLOB SERPL: 0.6 G/DL (ref 1–1.7)
ALP SERPL-CCNC: 67 U/L (ref 32–91)
ALT SERPL W P-5'-P-CCNC: 50 U/L (ref 17–63)
ANION GAP SERPL CALCULATED.3IONS-SCNC: 12.3 MMOL/L (ref 10–20)
ANISOCYTOSIS BLD QL: ABNORMAL
AST SERPL-CCNC: 46 U/L (ref 15–41)
BACTERIA SPEC AEROBE CULT: ABNORMAL
BILIRUB SERPL-MCNC: 0.6 MG/DL (ref 0.3–1.2)
BUN BLD-MCNC: 14 MG/DL (ref 8–20)
BUN/CREAT SERPL: 15.6 (ref 6.2–20.3)
CALCIUM SPEC-SCNC: 8.1 MG/DL (ref 8.9–10.3)
CHLORIDE SERPL-SCNC: 103 MMOL/L (ref 101–111)
CO2 SERPL-SCNC: 22 MMOL/L (ref 22–32)
CREAT BLD-MCNC: 0.9 MG/DL (ref 0.7–1.2)
DEPRECATED RDW RBC AUTO: 49.4 FL (ref 37–54)
EOSINOPHIL # BLD MANUAL: 0.52 10*3/MM3 (ref 0–0.4)
EOSINOPHIL NFR BLD MANUAL: 3 % (ref 0.3–6.2)
ERYTHROCYTE [DISTWIDTH] IN BLOOD BY AUTOMATED COUNT: 15.8 % (ref 12.3–15.4)
GFR SERPL CREATININE-BSD FRML MDRD: 97 ML/MIN/1.73
GIANT PLATELETS: ABNORMAL
GLOBULIN UR ELPH-MCNC: 4 GM/DL (ref 2.5–3.8)
GLUCOSE BLD-MCNC: 110 MG/DL (ref 65–99)
GRAM STN SPEC: ABNORMAL
GRAM STN SPEC: ABNORMAL
HCT VFR BLD AUTO: 31.4 % (ref 37.5–51)
HGB BLD-MCNC: 10.3 G/DL (ref 13–17.7)
LYMPHOCYTES # BLD MANUAL: 2.77 10*3/MM3 (ref 0.7–3.1)
LYMPHOCYTES NFR BLD MANUAL: 16 % (ref 19.6–45.3)
LYMPHOCYTES NFR BLD MANUAL: 9 % (ref 5–12)
MCH RBC QN AUTO: 29.1 PG (ref 26.6–33)
MCHC RBC AUTO-ENTMCNC: 32.7 G/DL (ref 31.5–35.7)
MCV RBC AUTO: 88.9 FL (ref 79–97)
METAMYELOCYTES NFR BLD MANUAL: 2 % (ref 0–0)
MONOCYTES # BLD AUTO: 1.56 10*3/MM3 (ref 0.1–0.9)
MYELOCYTES NFR BLD MANUAL: 1 % (ref 0–0)
NEUTROPHILS # BLD AUTO: 11.94 10*3/MM3 (ref 1.7–7)
NEUTROPHILS NFR BLD MANUAL: 57 % (ref 42.7–76)
NEUTS BAND NFR BLD MANUAL: 12 % (ref 0–5)
PLATELET # BLD AUTO: 304 10*3/MM3 (ref 140–450)
PMV BLD AUTO: 10.5 FL (ref 6–12)
POTASSIUM BLD-SCNC: 3.3 MMOL/L (ref 3.6–5.1)
POTASSIUM BLD-SCNC: 4.4 MMOL/L (ref 3.6–5.1)
PROT SERPL-MCNC: 6.2 G/DL (ref 6.1–7.9)
RBC # BLD AUTO: 3.53 10*6/MM3 (ref 4.14–5.8)
SCAN SLIDE: NORMAL
SODIUM BLD-SCNC: 134 MMOL/L (ref 136–144)
TOXIC GRANULATION: ABNORMAL
WBC NRBC COR # BLD: 17.3 10*3/MM3 (ref 3.4–10.8)

## 2019-06-29 PROCEDURE — 25010000002 LINEZOLID 600 MG/300ML SOLUTION: Performed by: INTERNAL MEDICINE

## 2019-06-29 PROCEDURE — 85007 BL SMEAR W/DIFF WBC COUNT: CPT | Performed by: INTERNAL MEDICINE

## 2019-06-29 PROCEDURE — 25010000002 CEFTRIAXONE PER 250 MG: Performed by: INTERNAL MEDICINE

## 2019-06-29 PROCEDURE — 85025 COMPLETE CBC W/AUTO DIFF WBC: CPT | Performed by: INTERNAL MEDICINE

## 2019-06-29 PROCEDURE — 80053 COMPREHEN METABOLIC PANEL: CPT | Performed by: INTERNAL MEDICINE

## 2019-06-29 PROCEDURE — 25010000002 HEPARIN (PORCINE) PER 1000 UNITS: Performed by: INTERNAL MEDICINE

## 2019-06-29 PROCEDURE — 97162 PT EVAL MOD COMPLEX 30 MIN: CPT

## 2019-06-29 PROCEDURE — 84132 ASSAY OF SERUM POTASSIUM: CPT | Performed by: INTERNAL MEDICINE

## 2019-06-29 PROCEDURE — 99232 SBSQ HOSP IP/OBS MODERATE 35: CPT | Performed by: INTERNAL MEDICINE

## 2019-06-29 RX ADMIN — POTASSIUM CHLORIDE 40 MEQ: 1500 TABLET, EXTENDED RELEASE ORAL at 06:13

## 2019-06-29 RX ADMIN — Medication 3 ML: at 21:39

## 2019-06-29 RX ADMIN — HEPARIN SODIUM 5000 UNITS: 5000 INJECTION INTRAVENOUS; SUBCUTANEOUS at 21:37

## 2019-06-29 RX ADMIN — LINEZOLID 600 MG: 600 INJECTION, SOLUTION INTRAVENOUS at 03:14

## 2019-06-29 RX ADMIN — CLINDAMYCIN PHOSPHATE 900 MG: 900 INJECTION, SOLUTION INTRAVENOUS at 02:23

## 2019-06-29 RX ADMIN — CLINDAMYCIN PHOSPHATE 900 MG: 900 INJECTION, SOLUTION INTRAVENOUS at 09:11

## 2019-06-29 RX ADMIN — CEFTRIAXONE SODIUM 2 G: 2 INJECTION, POWDER, FOR SOLUTION INTRAMUSCULAR; INTRAVENOUS at 15:17

## 2019-06-29 RX ADMIN — Medication 3 ML: at 09:12

## 2019-06-29 RX ADMIN — SODIUM CHLORIDE, SODIUM LACTATE, POTASSIUM CHLORIDE, AND CALCIUM CHLORIDE 100 ML/HR: 600; 310; 30; 20 INJECTION, SOLUTION INTRAVENOUS at 03:05

## 2019-06-29 RX ADMIN — HEPARIN SODIUM 5000 UNITS: 5000 INJECTION INTRAVENOUS; SUBCUTANEOUS at 06:16

## 2019-06-29 RX ADMIN — POTASSIUM CHLORIDE 40 MEQ: 1500 TABLET, EXTENDED RELEASE ORAL at 10:26

## 2019-06-29 RX ADMIN — HEPARIN SODIUM 5000 UNITS: 5000 INJECTION INTRAVENOUS; SUBCUTANEOUS at 15:16

## 2019-06-30 VITALS
SYSTOLIC BLOOD PRESSURE: 138 MMHG | HEIGHT: 73 IN | WEIGHT: 315 LBS | BODY MASS INDEX: 41.75 KG/M2 | RESPIRATION RATE: 18 BRPM | OXYGEN SATURATION: 94 % | HEART RATE: 89 BPM | DIASTOLIC BLOOD PRESSURE: 83 MMHG | TEMPERATURE: 98.1 F

## 2019-06-30 LAB
ALBUMIN SERPL-MCNC: 2.2 G/DL (ref 3.5–4.8)
ALBUMIN/GLOB SERPL: 0.5 G/DL (ref 1–1.7)
ALP SERPL-CCNC: 61 U/L (ref 32–91)
ALT SERPL W P-5'-P-CCNC: 44 U/L (ref 17–63)
ANION GAP SERPL CALCULATED.3IONS-SCNC: 14.9 MMOL/L (ref 10–20)
ANISOCYTOSIS BLD QL: ABNORMAL
ARTICHOKE IGE QN: 81 MG/DL (ref 0–100)
AST SERPL-CCNC: 49 U/L (ref 15–41)
BACTERIA SPEC AEROBE CULT: NORMAL
BACTERIA SPEC AEROBE CULT: NORMAL
BASOPHILS # BLD MANUAL: 0.17 10*3/MM3 (ref 0–0.2)
BASOPHILS NFR BLD AUTO: 1 % (ref 0–1.5)
BILIRUB SERPL-MCNC: 0.5 MG/DL (ref 0.3–1.2)
BUN BLD-MCNC: 10 MG/DL (ref 8–20)
BUN/CREAT SERPL: 14.3 (ref 6.2–20.3)
CALCIUM SPEC-SCNC: 8.5 MG/DL (ref 8.9–10.3)
CHLORIDE SERPL-SCNC: 106 MMOL/L (ref 101–111)
CHOLEST SERPL-MCNC: 132 MG/DL
CK SERPL-CCNC: 214 U/L (ref 49–397)
CO2 SERPL-SCNC: 22 MMOL/L (ref 22–32)
CREAT BLD-MCNC: 0.7 MG/DL (ref 0.7–1.2)
CRP SERPL-MCNC: 5.5 MG/DL (ref 0–0.7)
DEPRECATED RDW RBC AUTO: 48.6 FL (ref 37–54)
EOSINOPHIL # BLD MANUAL: 0.34 10*3/MM3 (ref 0–0.4)
EOSINOPHIL NFR BLD MANUAL: 2 % (ref 0.3–6.2)
ERYTHROCYTE [DISTWIDTH] IN BLOOD BY AUTOMATED COUNT: 15.5 % (ref 12.3–15.4)
ERYTHROCYTE [SEDIMENTATION RATE] IN BLOOD: 103 MM/HR (ref 0–15)
FERRITIN SERPL-MCNC: 184 NG/ML (ref 24–336)
GFR SERPL CREATININE-BSD FRML MDRD: 130 ML/MIN/1.73
GIANT PLATELETS: ABNORMAL
GLOBULIN UR ELPH-MCNC: 4.3 GM/DL (ref 2.5–3.8)
GLUCOSE BLD-MCNC: 101 MG/DL (ref 65–99)
HCT VFR BLD AUTO: 32.1 % (ref 37.5–51)
HDLC SERPL QL: 6.6
HDLC SERPL-MCNC: 20 MG/DL
HGB BLD-MCNC: 10.4 G/DL (ref 13–17.7)
LDLC/HDLC SERPL: 3.27 {RATIO}
LYMPHOCYTES # BLD MANUAL: 3.36 10*3/MM3 (ref 0.7–3.1)
LYMPHOCYTES NFR BLD MANUAL: 20 % (ref 19.6–45.3)
LYMPHOCYTES NFR BLD MANUAL: 6 % (ref 5–12)
MAGNESIUM SERPL-MCNC: 1.8 MG/DL (ref 1.8–2.5)
MCH RBC QN AUTO: 28.7 PG (ref 26.6–33)
MCHC RBC AUTO-ENTMCNC: 32.3 G/DL (ref 31.5–35.7)
MCV RBC AUTO: 88.7 FL (ref 79–97)
METAMYELOCYTES NFR BLD MANUAL: 1 % (ref 0–0)
MONOCYTES # BLD AUTO: 1.01 10*3/MM3 (ref 0.1–0.9)
MYELOCYTES NFR BLD MANUAL: 7 % (ref 0–0)
NEUTROPHILS # BLD AUTO: 10.58 10*3/MM3 (ref 1.7–7)
NEUTROPHILS NFR BLD MANUAL: 48 % (ref 42.7–76)
NEUTS BAND NFR BLD MANUAL: 15 % (ref 0–5)
PHOSPHATE SERPL-MCNC: 3.7 MG/DL (ref 2.4–4.7)
PLATELET # BLD AUTO: 306 10*3/MM3 (ref 140–450)
PMV BLD AUTO: 9.9 FL (ref 6–12)
POTASSIUM BLD-SCNC: 3.9 MMOL/L (ref 3.6–5.1)
PROT SERPL-MCNC: 6.5 G/DL (ref 6.1–7.9)
RBC # BLD AUTO: 3.62 10*6/MM3 (ref 4.14–5.8)
SCAN SLIDE: NORMAL
SODIUM BLD-SCNC: 139 MMOL/L (ref 136–144)
TOXIC GRANULATION: ABNORMAL
TRIGL SERPL-MCNC: 233 MG/DL
TROPONIN I SERPL-MCNC: <0.03 NG/ML (ref 0–0.03)
TSH SERPL DL<=0.05 MIU/L-ACNC: 4.63 MIU/ML (ref 0.34–5.6)
URATE SERPL-MCNC: 6.9 MG/DL (ref 4.8–8.7)
VIT B12 BLD-MCNC: 311 PG/ML (ref 180–914)
VLDLC SERPL-MCNC: 46.6 MG/DL
WBC NRBC COR # BLD: 16.8 10*3/MM3 (ref 3.4–10.8)

## 2019-06-30 PROCEDURE — 84443 ASSAY THYROID STIM HORMONE: CPT | Performed by: INTERNAL MEDICINE

## 2019-06-30 PROCEDURE — 25010000002 HEPARIN (PORCINE) PER 1000 UNITS: Performed by: INTERNAL MEDICINE

## 2019-06-30 PROCEDURE — 82728 ASSAY OF FERRITIN: CPT | Performed by: INTERNAL MEDICINE

## 2019-06-30 PROCEDURE — C1751 CATH, INF, PER/CENT/MIDLINE: HCPCS

## 2019-06-30 PROCEDURE — 25010000002 CEFTRIAXONE PER 250 MG: Performed by: INTERNAL MEDICINE

## 2019-06-30 PROCEDURE — 82607 VITAMIN B-12: CPT | Performed by: INTERNAL MEDICINE

## 2019-06-30 PROCEDURE — 25010000003 LIDOCAINE 1 % SOLUTION

## 2019-06-30 PROCEDURE — 99239 HOSP IP/OBS DSCHRG MGMT >30: CPT | Performed by: INTERNAL MEDICINE

## 2019-06-30 PROCEDURE — 80061 LIPID PANEL: CPT | Performed by: INTERNAL MEDICINE

## 2019-06-30 PROCEDURE — 84484 ASSAY OF TROPONIN QUANT: CPT | Performed by: INTERNAL MEDICINE

## 2019-06-30 PROCEDURE — 82550 ASSAY OF CK (CPK): CPT | Performed by: INTERNAL MEDICINE

## 2019-06-30 PROCEDURE — 25010000002 MORPHINE PER 10 MG: Performed by: SURGERY

## 2019-06-30 PROCEDURE — 85007 BL SMEAR W/DIFF WBC COUNT: CPT | Performed by: INTERNAL MEDICINE

## 2019-06-30 PROCEDURE — 83735 ASSAY OF MAGNESIUM: CPT | Performed by: INTERNAL MEDICINE

## 2019-06-30 PROCEDURE — 80053 COMPREHEN METABOLIC PANEL: CPT | Performed by: INTERNAL MEDICINE

## 2019-06-30 PROCEDURE — 05HB33Z INSERTION OF INFUSION DEVICE INTO RIGHT BASILIC VEIN, PERCUTANEOUS APPROACH: ICD-10-PCS | Performed by: INTERNAL MEDICINE

## 2019-06-30 PROCEDURE — 84550 ASSAY OF BLOOD/URIC ACID: CPT | Performed by: INTERNAL MEDICINE

## 2019-06-30 PROCEDURE — 85652 RBC SED RATE AUTOMATED: CPT | Performed by: INTERNAL MEDICINE

## 2019-06-30 PROCEDURE — 85025 COMPLETE CBC W/AUTO DIFF WBC: CPT | Performed by: INTERNAL MEDICINE

## 2019-06-30 PROCEDURE — 86140 C-REACTIVE PROTEIN: CPT | Performed by: INTERNAL MEDICINE

## 2019-06-30 PROCEDURE — 84100 ASSAY OF PHOSPHORUS: CPT | Performed by: INTERNAL MEDICINE

## 2019-06-30 RX ORDER — SODIUM CHLORIDE 0.9 % (FLUSH) 0.9 %
10 SYRINGE (ML) INJECTION EVERY 12 HOURS SCHEDULED
Status: DISCONTINUED | OUTPATIENT
Start: 2019-06-30 | End: 2019-06-30 | Stop reason: HOSPADM

## 2019-06-30 RX ORDER — SODIUM CHLORIDE 0.9 % (FLUSH) 0.9 %
10 SYRINGE (ML) INJECTION AS NEEDED
Status: DISCONTINUED | OUTPATIENT
Start: 2019-06-30 | End: 2019-06-30 | Stop reason: HOSPADM

## 2019-06-30 RX ORDER — LIDOCAINE HYDROCHLORIDE 10 MG/ML
INJECTION, SOLUTION INFILTRATION; PERINEURAL
Status: COMPLETED
Start: 2019-06-30 | End: 2019-06-30

## 2019-06-30 RX ADMIN — HEPARIN SODIUM 5000 UNITS: 5000 INJECTION INTRAVENOUS; SUBCUTANEOUS at 06:24

## 2019-06-30 RX ADMIN — CEFTRIAXONE SODIUM 2 G: 2 INJECTION, POWDER, FOR SOLUTION INTRAMUSCULAR; INTRAVENOUS at 14:14

## 2019-06-30 RX ADMIN — LIDOCAINE HYDROCHLORIDE: 10 INJECTION, SOLUTION INFILTRATION; PERINEURAL at 13:10

## 2019-06-30 RX ADMIN — MORPHINE SULFATE 4 MG: 4 INJECTION INTRAVENOUS at 14:52

## 2019-07-01 ENCOUNTER — READMISSION MANAGEMENT (OUTPATIENT)
Dept: CALL CENTER | Facility: HOSPITAL | Age: 34
End: 2019-07-01

## 2019-07-01 LAB — BACTERIA SPEC ANAEROBE CULT: NORMAL

## 2019-07-02 ENCOUNTER — READMISSION MANAGEMENT (OUTPATIENT)
Dept: CALL CENTER | Facility: HOSPITAL | Age: 34
End: 2019-07-02

## 2019-07-02 NOTE — OUTREACH NOTE
General Surgery Week 1 Survey      Responses   Facility patient discharged from?  [facility]   Does the patient have one of the following disease processes/diagnoses(primary or secondary)?  General Surgery   Is there a successful TCM telephone encounter documented?  No   Week 1 attempt successful?  No   Rescheduled  Revoked   Revoke  Decline to participate [NO ANSWER, NO VM]          Marika Diaz LPN

## 2019-07-02 NOTE — OUTREACH NOTE
Prep Survey      Responses   Facility patient discharged from?  [facility]   Is patient eligible?  Yes   Discharge diagnosis  Left leg cellulitis, I & D,- Wound VAC   Does the patient have one of the following disease processes/diagnoses(primary or secondary)?  General Surgery   Does the patient have Home health ordered?  Yes   What is the Home health agency?   [facility] for IV abx, wound care    Is there a DME ordered?  No   Medication alerts for this patient  IV rocephin   Prep survey completed?  Yes          Mary Jane Mo RN

## 2019-08-09 ENCOUNTER — OFFICE VISIT (OUTPATIENT)
Dept: SURGERY | Facility: CLINIC | Age: 34
End: 2019-08-09

## 2019-08-09 VITALS
HEIGHT: 73 IN | DIASTOLIC BLOOD PRESSURE: 117 MMHG | SYSTOLIC BLOOD PRESSURE: 195 MMHG | TEMPERATURE: 98.9 F | WEIGHT: 315 LBS | HEART RATE: 103 BPM | BODY MASS INDEX: 41.75 KG/M2 | OXYGEN SATURATION: 97 %

## 2019-08-09 DIAGNOSIS — T14.8XXA INFECTED WOUND: Primary | ICD-10-CM

## 2019-08-09 DIAGNOSIS — L08.9 INFECTED WOUND: Primary | ICD-10-CM

## 2019-08-09 PROCEDURE — 99213 OFFICE O/P EST LOW 20 MIN: CPT | Performed by: SURGERY

## 2019-08-09 RX ORDER — FOAM BANDAGE 4" X 4"
1 BANDAGE TOPICAL
Qty: 10 EACH | Refills: 2 | Status: SHIPPED | OUTPATIENT
Start: 2019-08-09

## 2019-08-09 NOTE — PROGRESS NOTES
"Subjective   Arden Castro is a 34 y.o. male.   Chief Complaint   Patient presents with   • Debridement     FU- Debridement of lower extremity wound     BP (!) 195/117   Pulse 103   Temp 98.9 °F (37.2 °C)   Ht 185.4 cm (72.99\")   Wt (!) 218 kg (481 lb)   SpO2 97%   BMI 63.47 kg/m²     HISTORY OF PRESENT ILLNESS:  Patient is a 34-year-old who underwent excisional debridement of the left lower extremity necrotic wound after a trauma back in late June.  He has been managed with a wound VAC for several weeks and the wound has healed and very well.  He is here to follow back up for a wound check.  He says that he has had no significant pain.  He has been ambulatory.  He denies any fevers or chills.  He has been keeping the wound uncovered since the removal of the wound VAC.      Outpatient Encounter Medications as of 8/9/2019   Medication Sig Dispense Refill   • Gauze Pads & Dressings (OPTIFOAM) 6\"X6\" pads 1 application Every 72 (Seventy-Two) Hours. 10 each 2     No facility-administered encounter medications on file as of 8/9/2019.          The following portions of the patient's history were reviewed and updated as appropriate: allergies, current medications, past family history, past medical history, past social history, past surgical history and problem list.  Objective   Physical Exam   Constitutional: He appears well-developed and well-nourished.   Obese   HENT:   Head: Normocephalic.   Eyes: Conjunctivae and EOM are normal.   Pulmonary/Chest: Effort normal. No respiratory distress.   Musculoskeletal:   In the anterior aspect of his left lower leg there is a 10 x 8.5 cm healed wound that is skin level with granulation tissue and no evidence of infection.  There is some edema left greater than right but it is better than when he was in the hospital.   Neurological: He is alert. No cranial nerve deficit.   Skin: Skin is warm and dry.         Assessment/Plan   Arden was seen today for " "debridement.    Diagnoses and all orders for this visit:    Infected wound    Other orders  -     Gauze Pads & Dressings (OPTIFOAM) 6\"X6\" pads; 1 application Every 72 (Seventy-Two) Hours.    Based on his BMI of 63 I have counseled him about the risks and benefits of moving forward with skin grafting versus allowing his wound to heal by secondary intention.  After our discussion I am going to give him a few weeks to see at what rate his wound will continue to heal.  If we are dealing with hyper granulation tissue or slow wound healing I will likely move towards split-thickness skin grafting of the left lower extremity.  However if he has pretty good wound healing as he has so far I may just allow this to granulate in.  I have counseled him about the risks of both he understands and is going to come back in a few weeks for a wound check.  I have given him a prescription for some silver impregnated topical dressings to help keep the bacterial load low and to help minimize excessive granulation.    Edward Solo MD  8/9/2019  2:12 PM  "

## 2024-10-08 ENCOUNTER — OFFICE VISIT (OUTPATIENT)
Dept: FAMILY MEDICINE CLINIC | Facility: CLINIC | Age: 39
End: 2024-10-08
Payer: MEDICAID

## 2024-10-08 ENCOUNTER — LAB (OUTPATIENT)
Dept: FAMILY MEDICINE CLINIC | Facility: CLINIC | Age: 39
End: 2024-10-08
Payer: MEDICAID

## 2024-10-08 VITALS
OXYGEN SATURATION: 97 % | HEART RATE: 96 BPM | TEMPERATURE: 97.5 F | WEIGHT: 315 LBS | DIASTOLIC BLOOD PRESSURE: 120 MMHG | SYSTOLIC BLOOD PRESSURE: 192 MMHG | BODY MASS INDEX: 41.75 KG/M2 | HEIGHT: 73 IN

## 2024-10-08 DIAGNOSIS — E66.01 MORBID OBESITY: ICD-10-CM

## 2024-10-08 DIAGNOSIS — Z00.00 PREVENTATIVE HEALTH CARE: Primary | ICD-10-CM

## 2024-10-08 DIAGNOSIS — I10 PRIMARY HYPERTENSION: ICD-10-CM

## 2024-10-08 LAB
25(OH)D3 SERPL-MCNC: 22.8 NG/ML (ref 30–100)
ALBUMIN SERPL-MCNC: 4.4 G/DL (ref 3.5–5.2)
ALBUMIN/GLOB SERPL: 1.1 G/DL
ALP SERPL-CCNC: 70 U/L (ref 39–117)
ALT SERPL W P-5'-P-CCNC: 19 U/L (ref 1–41)
ANION GAP SERPL CALCULATED.3IONS-SCNC: 14.7 MMOL/L (ref 5–15)
AST SERPL-CCNC: 23 U/L (ref 1–40)
BASOPHILS # BLD AUTO: 0.04 10*3/MM3 (ref 0–0.2)
BASOPHILS NFR BLD AUTO: 0.4 % (ref 0–1.5)
BILIRUB SERPL-MCNC: 0.8 MG/DL (ref 0–1.2)
BUN SERPL-MCNC: 12 MG/DL (ref 6–20)
BUN/CREAT SERPL: 13.2 (ref 7–25)
CALCIUM SPEC-SCNC: 9.7 MG/DL (ref 8.6–10.5)
CHLORIDE SERPL-SCNC: 102 MMOL/L (ref 98–107)
CHOLEST SERPL-MCNC: 183 MG/DL (ref 0–200)
CO2 SERPL-SCNC: 25.3 MMOL/L (ref 22–29)
CREAT SERPL-MCNC: 0.91 MG/DL (ref 0.76–1.27)
DEPRECATED RDW RBC AUTO: 46.5 FL (ref 37–54)
EGFRCR SERPLBLD CKD-EPI 2021: 110.6 ML/MIN/1.73
EOSINOPHIL # BLD AUTO: 0.16 10*3/MM3 (ref 0–0.4)
EOSINOPHIL NFR BLD AUTO: 1.8 % (ref 0.3–6.2)
ERYTHROCYTE [DISTWIDTH] IN BLOOD BY AUTOMATED COUNT: 15.7 % (ref 12.3–15.4)
GLOBULIN UR ELPH-MCNC: 4 GM/DL
GLUCOSE SERPL-MCNC: 138 MG/DL (ref 65–99)
HBA1C MFR BLD: 6.1 % (ref 4.8–5.6)
HCT VFR BLD AUTO: 40.8 % (ref 37.5–51)
HCV AB SER QL: NORMAL
HDLC SERPL-MCNC: 29 MG/DL (ref 40–60)
HGB BLD-MCNC: 12.7 G/DL (ref 13–17.7)
IMM GRANULOCYTES # BLD AUTO: 0.09 10*3/MM3 (ref 0–0.05)
IMM GRANULOCYTES NFR BLD AUTO: 1 % (ref 0–0.5)
LDLC SERPL CALC-MCNC: 109 MG/DL (ref 0–100)
LDLC/HDLC SERPL: 3.52 {RATIO}
LYMPHOCYTES # BLD AUTO: 1.53 10*3/MM3 (ref 0.7–3.1)
LYMPHOCYTES NFR BLD AUTO: 16.9 % (ref 19.6–45.3)
MCH RBC QN AUTO: 25.9 PG (ref 26.6–33)
MCHC RBC AUTO-ENTMCNC: 31.1 G/DL (ref 31.5–35.7)
MCV RBC AUTO: 83.3 FL (ref 79–97)
MONOCYTES # BLD AUTO: 0.45 10*3/MM3 (ref 0.1–0.9)
MONOCYTES NFR BLD AUTO: 5 % (ref 5–12)
NEUTROPHILS NFR BLD AUTO: 6.77 10*3/MM3 (ref 1.7–7)
NEUTROPHILS NFR BLD AUTO: 74.9 % (ref 42.7–76)
NRBC BLD AUTO-RTO: 0 /100 WBC (ref 0–0.2)
PLATELET # BLD AUTO: 363 10*3/MM3 (ref 140–450)
PMV BLD AUTO: 11.2 FL (ref 6–12)
POTASSIUM SERPL-SCNC: 4 MMOL/L (ref 3.5–5.2)
PROT SERPL-MCNC: 8.4 G/DL (ref 6–8.5)
RBC # BLD AUTO: 4.9 10*6/MM3 (ref 4.14–5.8)
SODIUM SERPL-SCNC: 142 MMOL/L (ref 136–145)
T4 FREE SERPL-MCNC: 1.05 NG/DL (ref 0.92–1.68)
TRIGL SERPL-MCNC: 260 MG/DL (ref 0–150)
TSH SERPL DL<=0.05 MIU/L-ACNC: 2.03 UIU/ML (ref 0.27–4.2)
VLDLC SERPL-MCNC: 45 MG/DL (ref 5–40)
WBC NRBC COR # BLD AUTO: 9.04 10*3/MM3 (ref 3.4–10.8)

## 2024-10-08 PROCEDURE — 84439 ASSAY OF FREE THYROXINE: CPT | Performed by: NURSE PRACTITIONER

## 2024-10-08 PROCEDURE — 83036 HEMOGLOBIN GLYCOSYLATED A1C: CPT | Performed by: NURSE PRACTITIONER

## 2024-10-08 PROCEDURE — 2014F MENTAL STATUS ASSESS: CPT | Performed by: NURSE PRACTITIONER

## 2024-10-08 PROCEDURE — 36415 COLL VENOUS BLD VENIPUNCTURE: CPT | Performed by: NURSE PRACTITIONER

## 2024-10-08 PROCEDURE — 99385 PREV VISIT NEW AGE 18-39: CPT | Performed by: NURSE PRACTITIONER

## 2024-10-08 PROCEDURE — 80050 GENERAL HEALTH PANEL: CPT | Performed by: NURSE PRACTITIONER

## 2024-10-08 PROCEDURE — 86803 HEPATITIS C AB TEST: CPT | Performed by: NURSE PRACTITIONER

## 2024-10-08 PROCEDURE — 82306 VITAMIN D 25 HYDROXY: CPT | Performed by: NURSE PRACTITIONER

## 2024-10-08 PROCEDURE — 80061 LIPID PANEL: CPT | Performed by: NURSE PRACTITIONER

## 2024-10-08 RX ORDER — LISINOPRIL 20 MG/1
20 TABLET ORAL DAILY
Qty: 30 TABLET | Refills: 0 | Status: SHIPPED | OUTPATIENT
Start: 2024-10-08

## 2024-10-08 NOTE — PROGRESS NOTES
Chief Complaint  Establish Care, Annual Exam, and Obesity (Wants to lose weight )    Subjective        Arden Castro is here to establish care  History of Present Illness  Arden is a 39-year-old male presenting today to establish care. He is morbidly obese and is interested in losing weight.  He says he is always struggled with his weight.  He walks on occasion; about a half a block when he walks his dog.  He says he eats a balanced diet.  He is also hypertensive during today's visit.  He denies any chest pain, headaches, or vision changes.          Previous PCP: Dr. Doherty  Specialist: none  Marital Status: single  Children: none  Occupation: unemployed   Exercise: walks occasionally  Diet: balanced  Tobacco use: denies  Alcohol use: denies  Recreational drug use: denies    Routine Health Maintenance:  Dental: overdue  Vision: 2023    Vaccines:  Influenza: declined  COVID: declined  Tdap: 2019      The following portions of the patient's history were reviewed and updated as appropriate: allergies, current medications, past family history, past medical history, past social history, past surgical history and problem list.    No Known Allergies      Current Outpatient Medications:     lisinopril (PRINIVIL,ZESTRIL) 20 MG tablet, Take 1 tablet by mouth Daily., Disp: 30 tablet, Rfl: 0    Family History   Problem Relation Age of Onset    Uterine cancer Mother     Liver cancer Mother     No Known Problems Father         Past Medical History:   Diagnosis Date    Morbid obesity with BMI of 60.0-69.9, adult 6/25/2019       Social History     Socioeconomic History    Marital status: Single   Tobacco Use    Smoking status: Never    Smokeless tobacco: Never   Vaping Use    Vaping status: Never Used   Substance and Sexual Activity    Alcohol use: No    Drug use: No    Sexual activity: Defer        Past Surgical History:   Procedure Laterality Date    LEG DEBRIDEMENT Left 6/26/2019    Procedure: Excisional debridement  "of left lower extremity 11 x 10 cm;  Surgeon: Edward Solo MD;  Location: [facility] MAIN OR;  Service: General        Patient Active Problem List   Diagnosis    Left leg cellulitis    Infected wound    Morbid obesity with BMI of 60.0-69.9, adult    Sepsis affecting skin       Immunization History   Administered Date(s) Administered    Tdap 06/26/2019         Objective   Vital Signs:  BP (!) 192/120 (BP Location: Left arm, Patient Position: Sitting, Cuff Size: Large Adult)   Pulse 96   Temp 97.5 °F (36.4 °C) (Temporal)   Ht 185.4 cm (72.99\")   Wt (!) 211 kg (465 lb)   SpO2 97%   BMI 61.37 kg/m²   Estimated body mass index is 61.37 kg/m² as calculated from the following:    Height as of this encounter: 185.4 cm (72.99\").    Weight as of this encounter: 211 kg (465 lb).             Review of Systems   Constitutional:  Negative for activity change, appetite change, chills, fatigue, fever and unexpected weight change.   HENT:  Negative for congestion, rhinorrhea, sneezing and sore throat.    Eyes:  Negative for visual disturbance.   Respiratory:  Negative for cough, shortness of breath and wheezing.    Cardiovascular:  Negative for chest pain.   Gastrointestinal:  Negative for abdominal pain, constipation, diarrhea, nausea and vomiting.   Genitourinary:  Negative for difficulty urinating and dysuria.   Musculoskeletal:  Negative for arthralgias, gait problem and myalgias.   Skin:  Negative for color change, rash and wound.   Neurological:  Negative for dizziness, weakness, light-headedness, numbness and headaches.   Psychiatric/Behavioral:  Negative for self-injury, sleep disturbance and suicidal ideas. The patient is not nervous/anxious.      Physical Exam  Constitutional:       Appearance: Normal appearance. He is obese.   HENT:      Head: Normocephalic and atraumatic.      Right Ear: Tympanic membrane, ear canal and external ear normal.      Left Ear: Tympanic membrane, ear canal and external ear normal.      " Nose: Nose normal.      Mouth/Throat:      Mouth: Mucous membranes are moist.      Pharynx: Oropharynx is clear.   Eyes:      Extraocular Movements: Extraocular movements intact.      Conjunctiva/sclera: Conjunctivae normal.      Pupils: Pupils are equal, round, and reactive to light.   Cardiovascular:      Rate and Rhythm: Normal rate and regular rhythm.      Pulses: Normal pulses.      Heart sounds: Normal heart sounds.   Pulmonary:      Effort: Pulmonary effort is normal.      Breath sounds: Normal breath sounds.   Abdominal:      General: Abdomen is flat. Bowel sounds are normal.      Palpations: Abdomen is soft.   Musculoskeletal:         General: Normal range of motion.      Cervical back: Normal range of motion and neck supple.   Skin:     General: Skin is warm and dry.      Capillary Refill: Capillary refill takes less than 2 seconds.   Neurological:      Mental Status: He is alert and oriented to person, place, and time.   Psychiatric:         Mood and Affect: Mood normal.         Behavior: Behavior normal.         Thought Content: Thought content normal.         Judgment: Judgment normal.        Result Review :                   Assessment and Plan   Diagnoses and all orders for this visit:    1. Preventative health care (Primary)  Comments:  labs ordered today  declined flu shot  follow up in 1 month  Orders:  -     Comprehensive Metabolic Panel  -     Hemoglobin A1c  -     Lipid Panel  -     T4, Free  -     TSH  -     Vitamin D,25-Hydroxy  -     CBC & Differential  -     Hepatitis C Antibody    2. Morbid obesity  Comments:  referral to bariatrics  Work on diet and exercise.  Recommend at least 150 minutes of physical activity per week.  Orders:  -     Ambulatory Referral to Bariatric Surgery    3. Primary hypertension  Comments:  Start lisinopril 20 mg daily.   Patient to return tomorrow for a nurse visit to recheck his blood pressure.  Will increase dose if blood pressure still elevated.  Reviewed  signs/symptoms of stroke.  Patient instructed to go to ER if he develops those symptoms.  Follow-up in 1 month    Other orders  -     lisinopril (PRINIVIL,ZESTRIL) 20 MG tablet; Take 1 tablet by mouth Daily.  Dispense: 30 tablet; Refill: 0             Follow Up   Return in about 1 month (around 11/8/2024) for Nurse Visit.  Patient was given instructions and counseling regarding his condition or for health maintenance advice. Please see specific information pulled into the AVS if appropriate.

## 2024-10-09 ENCOUNTER — CLINICAL SUPPORT (OUTPATIENT)
Dept: FAMILY MEDICINE CLINIC | Facility: CLINIC | Age: 39
End: 2024-10-09
Payer: MEDICAID

## 2024-10-09 ENCOUNTER — TELEPHONE (OUTPATIENT)
Dept: BARIATRICS/WEIGHT MGMT | Facility: CLINIC | Age: 39
End: 2024-10-09
Payer: MEDICAID

## 2024-10-09 VITALS — SYSTOLIC BLOOD PRESSURE: 244 MMHG | HEART RATE: 86 BPM | OXYGEN SATURATION: 94 % | DIASTOLIC BLOOD PRESSURE: 137 MMHG

## 2024-10-09 DIAGNOSIS — D64.9 ANEMIA, UNSPECIFIED TYPE: Primary | ICD-10-CM

## 2024-10-09 NOTE — PROGRESS NOTES
Left message to return call to doctor's office at [facility]. Either to get test results or message from provider.

## 2024-10-09 NOTE — PROGRESS NOTES
Patient took first dose of lisinopril 20 mg last night. Advised per lata to continue to monitor BP at home and call on Monday with readings for a possible med adjustment.

## 2024-10-10 NOTE — PROGRESS NOTES
Patient sister Martina White notified of test results per PHI. No questions. Patient verbalize understanding. Family

## 2024-10-21 ENCOUNTER — OFFICE VISIT (OUTPATIENT)
Dept: FAMILY MEDICINE CLINIC | Facility: CLINIC | Age: 39
End: 2024-10-21
Payer: MEDICAID

## 2024-10-21 VITALS
BODY MASS INDEX: 41.75 KG/M2 | HEART RATE: 92 BPM | SYSTOLIC BLOOD PRESSURE: 170 MMHG | HEIGHT: 73 IN | WEIGHT: 315 LBS | OXYGEN SATURATION: 98 % | DIASTOLIC BLOOD PRESSURE: 102 MMHG

## 2024-10-21 DIAGNOSIS — I10 PRIMARY HYPERTENSION: Primary | ICD-10-CM

## 2024-10-21 PROCEDURE — 3077F SYST BP >= 140 MM HG: CPT | Performed by: NURSE PRACTITIONER

## 2024-10-21 PROCEDURE — 99213 OFFICE O/P EST LOW 20 MIN: CPT | Performed by: NURSE PRACTITIONER

## 2024-10-21 PROCEDURE — 3080F DIAST BP >= 90 MM HG: CPT | Performed by: NURSE PRACTITIONER

## 2024-10-21 RX ORDER — LISINOPRIL 40 MG/1
40 TABLET ORAL DAILY
Qty: 30 TABLET | Refills: 0 | Status: SHIPPED | OUTPATIENT
Start: 2024-10-21

## 2024-10-21 NOTE — PROGRESS NOTES
"Chief Complaint  Hypertension (Going on for a while. )    Subjective        Arden Castro presents to [facility]  History of Present Illness  Arden is a 39-year-old male presenting today with complaints of high blood pressure.  He was last seen in our office on 10/8/2024 where we started lisinopril 20 mg daily.  His blood pressure is still staying elevated at home.  He says it is running 200s over 100s.  He denies any chest pain, headaches, or dizziness.        The following portions of the patient's history were reviewed and updated as appropriate: allergies, current medications, past family history, past medical history, past social history, past surgical history and problem list.    No Known Allergies    Patient Active Problem List   Diagnosis    Left leg cellulitis    Infected wound    Morbid obesity with BMI of 60.0-69.9, adult    Sepsis affecting skin    Primary hypertension       Current Outpatient Medications   Medication Instructions    lisinopril (PRINIVIL,ZESTRIL) 40 mg, Oral, Daily          Objective   Vital Signs:  BP (!) 170/102 (BP Location: Left arm, Patient Position: Sitting, Cuff Size: Adult) Comment (Cuff Size): forearm  Pulse 92   Ht 185.4 cm (72.99\")   Wt (!) 209 kg (460 lb)   SpO2 98%   BMI 60.71 kg/m²   Estimated body mass index is 60.71 kg/m² as calculated from the following:    Height as of this encounter: 185.4 cm (72.99\").    Weight as of this encounter: 209 kg (460 lb).               Review of Systems   Constitutional:  Negative for appetite change, diaphoresis, fatigue and unexpected weight change.   Eyes:  Negative for visual disturbance.   Respiratory:  Negative for cough, chest tightness, shortness of breath and wheezing.    Cardiovascular:  Negative for chest pain, palpitations and leg swelling.   Gastrointestinal:  Negative for nausea and vomiting.   Musculoskeletal:  Negative for back pain and gait problem.   Neurological:  Negative for " dizziness, syncope, weakness, light-headedness, numbness and headaches.   Psychiatric/Behavioral:  Negative for confusion. The patient is not nervous/anxious.         Physical Exam  Constitutional:       Appearance: Normal appearance.   Cardiovascular:      Rate and Rhythm: Normal rate and regular rhythm.      Pulses: Normal pulses.      Heart sounds: Normal heart sounds.   Pulmonary:      Effort: Pulmonary effort is normal.      Breath sounds: Normal breath sounds.   Skin:     General: Skin is warm and dry.   Neurological:      Mental Status: He is alert and oriented to person, place, and time.   Psychiatric:         Mood and Affect: Mood normal.         Behavior: Behavior normal.         Thought Content: Thought content normal.         Judgment: Judgment normal.        Result Review :                   Assessment and Plan   Diagnoses and all orders for this visit:    1. Primary hypertension (Primary)  Comments:  Increase lisinopril to 40 mg daily.  Continue monitoring blood pressure at home.  Call if no improvement.    Other orders  -     lisinopril (PRINIVIL,ZESTRIL) 40 MG tablet; Take 1 tablet by mouth Daily.  Dispense: 30 tablet; Refill: 0             Follow Up   No follow-ups on file.  Patient was given instructions and counseling regarding his condition or for health maintenance advice. Please see specific information pulled into the AVS if appropriate.

## 2024-11-19 ENCOUNTER — CLINICAL SUPPORT (OUTPATIENT)
Dept: FAMILY MEDICINE CLINIC | Facility: CLINIC | Age: 39
End: 2024-11-19
Payer: MEDICAID

## 2024-11-19 VITALS — DIASTOLIC BLOOD PRESSURE: 135 MMHG | SYSTOLIC BLOOD PRESSURE: 204 MMHG

## 2024-11-19 DIAGNOSIS — I10 PRIMARY HYPERTENSION: Primary | ICD-10-CM

## 2024-11-19 RX ORDER — AMLODIPINE BESYLATE 10 MG/1
10 TABLET ORAL DAILY
Qty: 30 TABLET | Refills: 0 | Status: SHIPPED | OUTPATIENT
Start: 2024-11-19

## 2024-11-19 NOTE — PROGRESS NOTES
Pt came in a nurse line to be check blood pressure. Pt blood pressure is 203/135 on 11/19/2024. Pt sates he not monitoring his b/p at home. He currenly taking  lisinopril 40 mg daily. Provider will add another medication and pt will monitor b/p at home daily. Pt will call in b/p and let provider know if anything abnormal.

## 2024-11-27 RX ORDER — LISINOPRIL 40 MG/1
40 TABLET ORAL DAILY
Qty: 30 TABLET | Refills: 0 | Status: SHIPPED | OUTPATIENT
Start: 2024-11-27

## 2024-11-27 NOTE — TELEPHONE ENCOUNTER
Caller: Arden Castro    Relationship: Self    Best call back number: [phone]     Requested Prescriptions:   Requested Prescriptions     Pending Prescriptions Disp Refills    lisinopril (PRINIVIL,ZESTRIL) 40 MG tablet 30 tablet 0     Sig: Take 1 tablet by mouth Daily.        Pharmacy where request should be sent: [id number] [place] [id number] - [phone]  - [phone]     Last office visit with prescribing clinician: 10/21/2024   Last telemedicine visit with prescribing clinician: Visit date not found   Next office visit with prescribing clinician: Visit date not found     Additional details provided by patient: PATIENT HAS 4 PILLS LEFT    Does the patient have less than a 3 day supply:  [] Yes  [x] No        Dyan Christianson Rep   11/27/24 12:31 EST

## 2024-12-10 ENCOUNTER — OFFICE VISIT (OUTPATIENT)
Dept: FAMILY MEDICINE CLINIC | Facility: CLINIC | Age: 39
End: 2024-12-10
Payer: MEDICAID

## 2024-12-10 VITALS
SYSTOLIC BLOOD PRESSURE: 223 MMHG | HEART RATE: 95 BPM | TEMPERATURE: 98.5 F | HEIGHT: 73 IN | BODY MASS INDEX: 41.75 KG/M2 | WEIGHT: 315 LBS | OXYGEN SATURATION: 97 % | DIASTOLIC BLOOD PRESSURE: 128 MMHG

## 2024-12-10 DIAGNOSIS — I10 PRIMARY HYPERTENSION: Primary | ICD-10-CM

## 2024-12-10 PROCEDURE — 99214 OFFICE O/P EST MOD 30 MIN: CPT | Performed by: NURSE PRACTITIONER

## 2024-12-10 PROCEDURE — 3077F SYST BP >= 140 MM HG: CPT | Performed by: NURSE PRACTITIONER

## 2024-12-10 PROCEDURE — 3080F DIAST BP >= 90 MM HG: CPT | Performed by: NURSE PRACTITIONER

## 2024-12-10 RX ORDER — LISINOPRIL AND HYDROCHLOROTHIAZIDE 20; 25 MG/1; MG/1
2 TABLET ORAL DAILY
Qty: 60 TABLET | Refills: 0 | Status: SHIPPED | OUTPATIENT
Start: 2024-12-10

## 2024-12-10 NOTE — PROGRESS NOTES
"Chief Complaint  Hypertension (F/u on bp )    Subjective        Arden Castro presents to [facility]  History of Present Illness  Moy is a 39-year-old male presenting today to follow-up on his blood pressure.  He currently takes amlodipine 10 mg daily and lisinopril 40 mg daily. His blood pressure is still not controlled. It is ranging 190-200/112-120 at home. He has lost around 25 pounds by walking his dog. He denies any chest pain or headaches.          The following portions of the patient's history were reviewed and updated as appropriate: allergies, current medications, past family history, past medical history, past social history, past surgical history and problem list.    No Known Allergies    Patient Active Problem List   Diagnosis    Left leg cellulitis    Infected wound    Morbid obesity with BMI of 60.0-69.9, adult    Sepsis affecting skin    Primary hypertension       Current Outpatient Medications   Medication Instructions    amLODIPine (NORVASC) 10 mg, Oral, Daily    lisinopril-hydrochlorothiazide (PRINZIDE,ZESTORETIC) 20-25 MG per tablet 2 tablets, Oral, Daily          Objective   Vital Signs:  BP (!) 223/128 (BP Location: Left arm, Patient Position: Sitting, Cuff Size: Large Adult)   Pulse 95   Temp 98.5 °F (36.9 °C) (Temporal)   Ht 185.4 cm (72.99\")   Wt (!) 203 kg (447 lb)   SpO2 97%   BMI 58.99 kg/m²   Estimated body mass index is 58.99 kg/m² as calculated from the following:    Height as of this encounter: 185.4 cm (72.99\").    Weight as of this encounter: 203 kg (447 lb).               Review of Systems   Constitutional:  Negative for appetite change, diaphoresis, fatigue and unexpected weight change.   Eyes:  Negative for visual disturbance.   Respiratory:  Negative for cough, chest tightness, shortness of breath and wheezing.    Cardiovascular:  Negative for chest pain, palpitations and leg swelling.   Gastrointestinal:  Negative for nausea and " vomiting.   Musculoskeletal:  Negative for back pain and gait problem.   Neurological:  Negative for dizziness, syncope, weakness, light-headedness, numbness and headaches.   Psychiatric/Behavioral:  Negative for confusion. The patient is not nervous/anxious.         Physical Exam  Constitutional:       Appearance: Normal appearance.   Cardiovascular:      Rate and Rhythm: Normal rate and regular rhythm.      Pulses: Normal pulses.      Heart sounds: Normal heart sounds.   Pulmonary:      Effort: Pulmonary effort is normal.      Breath sounds: Normal breath sounds.   Neurological:      Mental Status: He is alert and oriented to person, place, and time.   Psychiatric:         Mood and Affect: Mood normal.         Behavior: Behavior normal.         Thought Content: Thought content normal.         Judgment: Judgment normal.        Result Review :                   Assessment and Plan   Diagnoses and all orders for this visit:    1. Primary hypertension (Primary)  Comments:  Unstable.  Discontinue lisinopril and start Lisinopril-HCTZ 2 tablets daily  Cont amlodipine  work on diet and exercise  f/u 1 mon    Other orders  -     lisinopril-hydrochlorothiazide (PRINZIDE,ZESTORETIC) 20-25 MG per tablet; Take 2 tablets by mouth Daily.  Dispense: 60 tablet; Refill: 0             Follow Up   Return if symptoms worsen or fail to improve.  Patient was given instructions and counseling regarding his condition or for health maintenance advice. Please see specific information pulled into the AVS if appropriate.

## 2024-12-10 NOTE — PATIENT INSTRUCTIONS
Stop lisinopril.  Continue amlodipine.  Start lisinopril-hctz 2 tablets daily.  Continue monitoring blood pressure at home.  Please call with a weeks worth of readings.  Goal is 140/90 or less

## 2024-12-19 NOTE — TELEPHONE ENCOUNTER
Caller: Arden Castro    Relationship: Self    Best call back number:  Telephone Information:   Mobile [phone]         Requested Prescriptions:   Requested Prescriptions     Pending Prescriptions Disp Refills    amLODIPine (NORVASC) 10 MG tablet 30 tablet 0     Sig: Take 1 tablet by mouth Daily.        Pharmacy where request should be sent: [facility] [address] [place] [id number] [facility] - [phone]  - [phone]      Last office visit with prescribing clinician: 12/10/2024   Last telemedicine visit with prescribing clinician: Visit date not found   Next office visit with prescribing clinician: 1/10/2025         Does the patient have less than a 3 day supply:  [x] Yes  [] No    Would you like a call back once the refill request has been completed: [] Yes [] No    If the office needs to give you a call back, can they leave a voicemail: [] Yes [] No    Dyan Peralta Rep   12/19/24 12:34 EST

## 2024-12-20 RX ORDER — AMLODIPINE BESYLATE 10 MG/1
10 TABLET ORAL DAILY
Qty: 30 TABLET | Refills: 0 | Status: SHIPPED | OUTPATIENT
Start: 2024-12-20

## 2025-01-13 RX ORDER — LISINOPRIL AND HYDROCHLOROTHIAZIDE 20; 25 MG/1; MG/1
2 TABLET ORAL DAILY
Qty: 60 TABLET | Refills: 0 | Status: SHIPPED | OUTPATIENT
Start: 2025-01-13

## 2025-01-13 RX ORDER — AMLODIPINE BESYLATE 10 MG/1
10 TABLET ORAL DAILY
Qty: 30 TABLET | Refills: 0 | Status: SHIPPED | OUTPATIENT
Start: 2025-01-13

## 2025-01-13 NOTE — TELEPHONE ENCOUNTER
Caller: Arden Castro    Relationship: Self    Best call back number: [phone]     Requested Prescriptions:   Requested Prescriptions     Pending Prescriptions Disp Refills    amLODIPine (NORVASC) 10 MG tablet 30 tablet 0     Sig: Take 1 tablet by mouth Daily.    lisinopril-hydrochlorothiazide (PRINZIDE,ZESTORETIC) 20-25 MG per tablet 60 tablet 0     Sig: Take 2 tablets by mouth Daily.        Pharmacy where request should be sent: [facility] [address] [place] [id number] [facility] - [phone] [facility] [phone]      Last office visit with prescribing clinician: 12/10/2024   Last telemedicine visit with prescribing clinician: Visit date not found   Next office visit with prescribing clinician: Visit date not found     Additional details provided by patient:     Does the patient have less than a 3 day supply:  [x] Yes  [] No    Would you like a call back once the refill request has been completed: [] Yes [] No    If the office needs to give you a call back, can they leave a voicemail: [] Yes [] No    Dyan Braga Rep   01/13/25 13:08 EST

## 2025-01-14 ENCOUNTER — TELEPHONE (OUTPATIENT)
Dept: BARIATRICS/WEIGHT MGMT | Facility: CLINIC | Age: 40
End: 2025-01-14
Payer: MEDICAID

## 2025-02-18 RX ORDER — AMLODIPINE BESYLATE 10 MG/1
10 TABLET ORAL DAILY
Qty: 30 TABLET | Refills: 0 | Status: SHIPPED | OUTPATIENT
Start: 2025-02-18

## 2025-02-18 RX ORDER — LISINOPRIL AND HYDROCHLOROTHIAZIDE 20; 25 MG/1; MG/1
2 TABLET ORAL DAILY
Qty: 60 TABLET | Refills: 0 | Status: SHIPPED | OUTPATIENT
Start: 2025-02-18

## 2025-02-18 NOTE — TELEPHONE ENCOUNTER
Caller: Arden Castro    Relationship: Self    Best call back number: [phone]     Requested Prescriptions:   Requested Prescriptions     Pending Prescriptions Disp Refills    amLODIPine (NORVASC) 10 MG tablet 30 tablet 0     Sig: Take 1 tablet by mouth Daily.    lisinopril-hydrochlorothiazide (PRINZIDE,ZESTORETIC) 20-25 MG per tablet 60 tablet 0     Sig: Take 2 tablets by mouth Daily.        Pharmacy where request should be sent: [facility] [address] [place] [id number] [facility] - [phone] [facility] [phone]      Last office visit with prescribing clinician: 12/10/2024   Last telemedicine visit with prescribing clinician: Visit date not found   Next office visit with prescribing clinician: Visit date not found     Additional details provided by patient:     Does the patient have less than a 3 day supply:  [x] Yes  [] No    Would you like a call back once the refill request has been completed: [] Yes [] No    If the office needs to give you a call back, can they leave a voicemail: [] Yes [] No    Dyan Marcial Rep   02/18/25 12:25 EST

## 2025-03-26 RX ORDER — LISINOPRIL AND HYDROCHLOROTHIAZIDE 20; 25 MG/1; MG/1
2 TABLET ORAL DAILY
Qty: 60 TABLET | Refills: 0 | Status: SHIPPED | OUTPATIENT
Start: 2025-03-26

## 2025-03-26 RX ORDER — AMLODIPINE BESYLATE 10 MG/1
10 TABLET ORAL DAILY
Qty: 30 TABLET | Refills: 0 | Status: SHIPPED | OUTPATIENT
Start: 2025-03-26

## 2025-03-26 NOTE — TELEPHONE ENCOUNTER
Caller: Arden Castro    Relationship: Self    Best call back number:     [phone] (Mobile)       Requested Prescriptions:   Requested Prescriptions     Pending Prescriptions Disp Refills    amLODIPine (NORVASC) 10 MG tablet 30 tablet 0     Sig: Take 1 tablet by mouth Daily.    lisinopril-hydrochlorothiazide (PRINZIDE,ZESTORETIC) 20-25 MG per tablet 60 tablet 0     Sig: Take 2 tablets by mouth Daily.        Pharmacy where request should be sent: [facility] [address] [place] [id number] [facility] - [phone]  - [phone] FX     Last office visit with prescribing clinician: 12/10/2024   Last telemedicine visit with prescribing clinician: Visit date not found   Next office visit with prescribing clinician: Visit date not found     Additional details provided by patient:     Does the patient have less than a 3 day supply:  [] Yes  [] No    Would you like a call back once the refill request has been completed: [] Yes [] No    If the office needs to give you a call back, can they leave a voicemail: [] Yes [] No    Dyan Fan Rep   03/26/25 12:38 EDT

## 2025-05-01 NOTE — TELEPHONE ENCOUNTER
Caller: Arden Castro    Relationship: Self    Best call back number: [phone]    Requested Prescriptions:   Requested Prescriptions     Pending Prescriptions Disp Refills    amLODIPine (NORVASC) 10 MG tablet 30 tablet 0     Sig: Take 1 tablet by mouth Daily.    lisinopril-hydrochlorothiazide (PRINZIDE,ZESTORETIC) 20-25 MG per tablet 60 tablet 0     Sig: Take 2 tablets by mouth Daily.        Pharmacy where request should be sent: [facility] [address] [place] [address] [facility] - [phone] [facility] [phone] FX     Last office visit with prescribing clinician: 12/10/2024   Last telemedicine visit with prescribing clinician: Visit date not found   Next office visit with prescribing clinician: Visit date not found     Does the patient have less than a 3 day supply:  [x] Yes  [] No    Dyan Ogden Rep   05/01/25 12:46 EDT

## 2025-05-02 RX ORDER — AMLODIPINE BESYLATE 10 MG/1
10 TABLET ORAL DAILY
Qty: 30 TABLET | Refills: 0 | Status: SHIPPED | OUTPATIENT
Start: 2025-05-02

## 2025-05-02 RX ORDER — LISINOPRIL AND HYDROCHLOROTHIAZIDE 20; 25 MG/1; MG/1
2 TABLET ORAL DAILY
Qty: 60 TABLET | Refills: 0 | Status: SHIPPED | OUTPATIENT
Start: 2025-05-02

## 2025-06-06 RX ORDER — LISINOPRIL AND HYDROCHLOROTHIAZIDE 20; 25 MG/1; MG/1
2 TABLET ORAL DAILY
Qty: 60 TABLET | Refills: 0 | Status: SHIPPED | OUTPATIENT
Start: 2025-06-06

## 2025-06-06 RX ORDER — AMLODIPINE BESYLATE 10 MG/1
10 TABLET ORAL DAILY
Qty: 30 TABLET | Refills: 0 | Status: SHIPPED | OUTPATIENT
Start: 2025-06-06

## 2025-06-06 NOTE — TELEPHONE ENCOUNTER
Caller: Arden Castro    Relationship: Self    Best call back number: [phone]     Requested Prescriptions:   Requested Prescriptions     Pending Prescriptions Disp Refills    amLODIPine (NORVASC) 10 MG tablet 30 tablet 0     Sig: Take 1 tablet by mouth Daily.    lisinopril-hydrochlorothiazide (PRINZIDE,ZESTORETIC) 20-25 MG per tablet 60 tablet 0     Sig: Take 2 tablets by mouth Daily.        Pharmacy where request should be sent: [facility] [address] [place] [id number] [facility] - [phone] [facility] [phone] FX     Last office visit with prescribing clinician: 12/10/2024   Last telemedicine visit with prescribing clinician: Visit date not found   Next office visit with prescribing clinician: Visit date not found     Additional details provided by patient: HAS 2 DAYS LEFT    Does the patient have less than a 3 day supply:  [x] Yes  [] No      Dyan Christianson   06/06/25 12:49 EDT

## 2025-07-08 RX ORDER — AMLODIPINE BESYLATE 10 MG/1
10 TABLET ORAL DAILY
Qty: 30 TABLET | Refills: 0 | Status: SHIPPED | OUTPATIENT
Start: 2025-07-08

## 2025-07-08 RX ORDER — LISINOPRIL AND HYDROCHLOROTHIAZIDE 20; 25 MG/1; MG/1
2 TABLET ORAL DAILY
Qty: 60 TABLET | Refills: 0 | Status: SHIPPED | OUTPATIENT
Start: 2025-07-08

## 2025-07-08 NOTE — TELEPHONE ENCOUNTER
Caller: Arden Castro    Relationship: Self    Best call back number:     [phone]       Requested Prescriptions:   Requested Prescriptions     Pending Prescriptions Disp Refills    lisinopril-hydrochlorothiazide (PRINZIDE,ZESTORETIC) 20-25 MG per tablet 60 tablet 0     Sig: Take 2 tablets by mouth Daily.    amLODIPine (NORVASC) 10 MG tablet 30 tablet 0     Sig: Take 1 tablet by mouth Daily.        Pharmacy where request should be sent: [facility] [address] [place] [id number] [facility] - [phone] [facility] [phone]      Last office visit with prescribing clinician: 12/10/2024   Last telemedicine visit with prescribing clinician: Visit date not found   Next office visit with prescribing clinician: Visit date not found     Additional details provided by patient:     Does the patient have less than a 3 day supply:  [x] Yes  [] No    Would you like a call back once the refill request has been completed: [] Yes [] No    If the office needs to give you a call back, can they leave a voicemail: [] Yes [] No    Dyan Braga Rep   07/08/25 12:23 EDT

## 2025-08-12 RX ORDER — LISINOPRIL AND HYDROCHLOROTHIAZIDE 20; 25 MG/1; MG/1
2 TABLET ORAL DAILY
Qty: 60 TABLET | Refills: 0 | Status: SHIPPED | OUTPATIENT
Start: 2025-08-12

## 2025-08-12 RX ORDER — AMLODIPINE BESYLATE 10 MG/1
10 TABLET ORAL DAILY
Qty: 30 TABLET | Refills: 0 | Status: SHIPPED | OUTPATIENT
Start: 2025-08-12

## (undated) DEVICE — PAD,ABDOMINAL,5"X9",STERILE,LF,1/PK: Brand: MEDLINE INDUSTRIES, INC.

## (undated) DEVICE — SOL IRRIG NACL 9PCT 1000ML BTL

## (undated) DEVICE — STANDARD HYPODERMIC NEEDLE,POLYPROPYLENE HUB: Brand: MONOJECT

## (undated) DEVICE — UNDERGLV SURG BIOGEL INDICATOR LTX PF 7

## (undated) DEVICE — SOL IRRIG H2O 1000ML STRL

## (undated) DEVICE — BANDAGE,GAUZE,BULKEE II,4.5"X4.1YD,STRL: Brand: MEDLINE

## (undated) DEVICE — CUFF SCD HEMOFORCE SEQ CALF STD MD

## (undated) DEVICE — SYR LL TP 10ML STRL

## (undated) DEVICE — PK PROC TURNOVER

## (undated) DEVICE — WET SKIN PREP TRAY: Brand: MEDLINE INDUSTRIES, INC.

## (undated) DEVICE — SPNG LAP PREWSH SFTPK 18X18IN STRL PK/5

## (undated) DEVICE — DECANTER: Brand: UNBRANDED

## (undated) DEVICE — PK MINOR LAPAROTOMY 50

## (undated) DEVICE — GLV SURG BIOGEL LTX PF 7